# Patient Record
Sex: MALE | Race: WHITE | NOT HISPANIC OR LATINO | Employment: FULL TIME | URBAN - METROPOLITAN AREA
[De-identification: names, ages, dates, MRNs, and addresses within clinical notes are randomized per-mention and may not be internally consistent; named-entity substitution may affect disease eponyms.]

---

## 2019-10-25 ENCOUNTER — OFFICE VISIT - HEALTHEAST (OUTPATIENT)
Dept: INTERNAL MEDICINE | Facility: CLINIC | Age: 49
End: 2019-10-25

## 2019-10-25 ENCOUNTER — COMMUNICATION - HEALTHEAST (OUTPATIENT)
Dept: TELEHEALTH | Facility: CLINIC | Age: 49
End: 2019-10-25

## 2019-10-25 DIAGNOSIS — H69.93 DYSFUNCTION OF BOTH EUSTACHIAN TUBES: ICD-10-CM

## 2019-10-25 RX ORDER — ASPIRIN 81 MG/1
81 TABLET, CHEWABLE ORAL
Status: SHIPPED | COMMUNITY
Start: 2018-12-23

## 2019-10-25 ASSESSMENT — MIFFLIN-ST. JEOR: SCORE: 1819.27

## 2019-11-19 ENCOUNTER — COMMUNICATION - HEALTHEAST (OUTPATIENT)
Dept: INTERNAL MEDICINE | Facility: CLINIC | Age: 49
End: 2019-11-19

## 2019-11-19 ENCOUNTER — COMMUNICATION - HEALTHEAST (OUTPATIENT)
Dept: SCHEDULING | Facility: CLINIC | Age: 49
End: 2019-11-19

## 2019-11-19 DIAGNOSIS — E78.5 HYPERLIPIDEMIA LDL GOAL <100: ICD-10-CM

## 2019-11-26 ENCOUNTER — COMMUNICATION - HEALTHEAST (OUTPATIENT)
Dept: SCHEDULING | Facility: CLINIC | Age: 49
End: 2019-11-26

## 2019-11-26 DIAGNOSIS — E78.5 HYPERLIPIDEMIA LDL GOAL <100: ICD-10-CM

## 2019-12-17 ENCOUNTER — OFFICE VISIT - HEALTHEAST (OUTPATIENT)
Dept: INTERNAL MEDICINE | Facility: CLINIC | Age: 49
End: 2019-12-17

## 2019-12-17 DIAGNOSIS — I25.10 CORONARY ARTERY DISEASE INVOLVING NATIVE HEART WITHOUT ANGINA PECTORIS, UNSPECIFIED VESSEL OR LESION TYPE: ICD-10-CM

## 2019-12-17 DIAGNOSIS — Z86.73 HISTORY OF STROKE: ICD-10-CM

## 2019-12-17 DIAGNOSIS — I25.2 HISTORY OF HEART ATTACK: ICD-10-CM

## 2019-12-17 DIAGNOSIS — E78.5 HYPERLIPIDEMIA LDL GOAL <100: ICD-10-CM

## 2019-12-17 DIAGNOSIS — R73.03 PREDIABETES: ICD-10-CM

## 2019-12-17 DIAGNOSIS — Z13.29 SCREENING FOR THYROID DISORDER: ICD-10-CM

## 2019-12-17 DIAGNOSIS — Z12.5 SCREENING FOR PROSTATE CANCER: ICD-10-CM

## 2019-12-17 DIAGNOSIS — H69.93 DYSFUNCTION OF BOTH EUSTACHIAN TUBES: ICD-10-CM

## 2019-12-17 DIAGNOSIS — Z76.89 ENCOUNTER TO ESTABLISH CARE: ICD-10-CM

## 2019-12-17 DIAGNOSIS — Z87.891 FORMER SMOKER: ICD-10-CM

## 2019-12-17 ASSESSMENT — ANXIETY QUESTIONNAIRES
1. FEELING NERVOUS, ANXIOUS, OR ON EDGE: SEVERAL DAYS
7. FEELING AFRAID AS IF SOMETHING AWFUL MIGHT HAPPEN: NOT AT ALL
4. TROUBLE RELAXING: NOT AT ALL
2. NOT BEING ABLE TO STOP OR CONTROL WORRYING: SEVERAL DAYS
3. WORRYING TOO MUCH ABOUT DIFFERENT THINGS: SEVERAL DAYS
IF YOU CHECKED OFF ANY PROBLEMS ON THIS QUESTIONNAIRE, HOW DIFFICULT HAVE THESE PROBLEMS MADE IT FOR YOU TO DO YOUR WORK, TAKE CARE OF THINGS AT HOME, OR GET ALONG WITH OTHER PEOPLE: NOT DIFFICULT AT ALL
6. BECOMING EASILY ANNOYED OR IRRITABLE: NOT AT ALL
GAD7 TOTAL SCORE: 3
5. BEING SO RESTLESS THAT IT IS HARD TO SIT STILL: NOT AT ALL

## 2019-12-17 ASSESSMENT — PATIENT HEALTH QUESTIONNAIRE - PHQ9: SUM OF ALL RESPONSES TO PHQ QUESTIONS 1-9: 1

## 2019-12-17 ASSESSMENT — MIFFLIN-ST. JEOR: SCORE: 1832.88

## 2019-12-20 ENCOUNTER — AMBULATORY - HEALTHEAST (OUTPATIENT)
Dept: LAB | Facility: CLINIC | Age: 49
End: 2019-12-20

## 2019-12-20 DIAGNOSIS — I25.2 HISTORY OF HEART ATTACK: ICD-10-CM

## 2019-12-20 DIAGNOSIS — Z13.29 SCREENING FOR THYROID DISORDER: ICD-10-CM

## 2019-12-20 DIAGNOSIS — E78.5 HYPERLIPIDEMIA LDL GOAL <100: ICD-10-CM

## 2019-12-20 DIAGNOSIS — R73.03 PREDIABETES: ICD-10-CM

## 2019-12-20 DIAGNOSIS — Z12.5 SCREENING FOR PROSTATE CANCER: ICD-10-CM

## 2019-12-20 LAB
ALBUMIN SERPL-MCNC: 4.2 G/DL (ref 3.5–5)
ALBUMIN UR-MCNC: NEGATIVE MG/DL
ALP SERPL-CCNC: 120 U/L (ref 45–120)
ALT SERPL W P-5'-P-CCNC: 51 U/L (ref 0–45)
AMORPH CRY #/AREA URNS HPF: ABNORMAL /[HPF]
ANION GAP SERPL CALCULATED.3IONS-SCNC: 9 MMOL/L (ref 5–18)
APPEARANCE UR: ABNORMAL
AST SERPL W P-5'-P-CCNC: 23 U/L (ref 0–40)
BACTERIA #/AREA URNS HPF: ABNORMAL HPF
BASOPHILS # BLD AUTO: 0.1 THOU/UL (ref 0–0.2)
BASOPHILS NFR BLD AUTO: 1 % (ref 0–2)
BILIRUB SERPL-MCNC: 0.5 MG/DL (ref 0–1)
BILIRUB UR QL STRIP: NEGATIVE
BUN SERPL-MCNC: 15 MG/DL (ref 8–22)
CALCIUM SERPL-MCNC: 9.6 MG/DL (ref 8.5–10.5)
CHLORIDE BLD-SCNC: 105 MMOL/L (ref 98–107)
CHOLEST SERPL-MCNC: 163 MG/DL
CO2 SERPL-SCNC: 27 MMOL/L (ref 22–31)
COLOR UR AUTO: YELLOW
CREAT SERPL-MCNC: 0.86 MG/DL (ref 0.7–1.3)
EOSINOPHIL # BLD AUTO: 0.4 THOU/UL (ref 0–0.4)
EOSINOPHIL NFR BLD AUTO: 5 % (ref 0–6)
ERYTHROCYTE [DISTWIDTH] IN BLOOD BY AUTOMATED COUNT: 11 % (ref 11–14.5)
FASTING STATUS PATIENT QL REPORTED: YES
GFR SERPL CREATININE-BSD FRML MDRD: >60 ML/MIN/1.73M2
GLUCOSE BLD-MCNC: 123 MG/DL (ref 70–125)
GLUCOSE UR STRIP-MCNC: NEGATIVE MG/DL
HBA1C MFR BLD: 5.9 % (ref 3.5–6)
HCT VFR BLD AUTO: 46.3 % (ref 40–54)
HDLC SERPL-MCNC: 45 MG/DL
HGB BLD-MCNC: 15.3 G/DL (ref 14–18)
HGB UR QL STRIP: NEGATIVE
KETONES UR STRIP-MCNC: NEGATIVE MG/DL
LDLC SERPL CALC-MCNC: 93 MG/DL
LEUKOCYTE ESTERASE UR QL STRIP: NEGATIVE
LYMPHOCYTES # BLD AUTO: 1.4 THOU/UL (ref 0.8–4.4)
LYMPHOCYTES NFR BLD AUTO: 17 % (ref 20–40)
MCH RBC QN AUTO: 30.7 PG (ref 27–34)
MCHC RBC AUTO-ENTMCNC: 33.1 G/DL (ref 32–36)
MCV RBC AUTO: 93 FL (ref 80–100)
MONOCYTES # BLD AUTO: 0.7 THOU/UL (ref 0–0.9)
MONOCYTES NFR BLD AUTO: 8 % (ref 2–10)
NEUTROPHILS # BLD AUTO: 5.7 THOU/UL (ref 2–7.7)
NEUTROPHILS NFR BLD AUTO: 69 % (ref 50–70)
NITRATE UR QL: NEGATIVE
PH UR STRIP: 7 [PH] (ref 5–8)
PLATELET # BLD AUTO: 217 THOU/UL (ref 140–440)
PMV BLD AUTO: 7.4 FL (ref 7–10)
POTASSIUM BLD-SCNC: 4.2 MMOL/L (ref 3.5–5)
PROT SERPL-MCNC: 7 G/DL (ref 6–8)
PSA SERPL-MCNC: 0.3 NG/ML (ref 0–2.5)
RBC # BLD AUTO: 4.99 MILL/UL (ref 4.4–6.2)
RBC #/AREA URNS AUTO: ABNORMAL HPF
SODIUM SERPL-SCNC: 141 MMOL/L (ref 136–145)
SP GR UR STRIP: 1.02 (ref 1–1.03)
SQUAMOUS #/AREA URNS AUTO: ABNORMAL LPF
TRIGL SERPL-MCNC: 127 MG/DL
TSH SERPL DL<=0.005 MIU/L-ACNC: 1.79 UIU/ML (ref 0.3–5)
UROBILINOGEN UR STRIP-ACNC: ABNORMAL
WBC #/AREA URNS AUTO: ABNORMAL HPF
WBC: 8.3 THOU/UL (ref 4–11)

## 2020-02-19 ENCOUNTER — COMMUNICATION - HEALTHEAST (OUTPATIENT)
Dept: INTERNAL MEDICINE | Facility: CLINIC | Age: 50
End: 2020-02-19

## 2020-02-19 DIAGNOSIS — E78.5 HYPERLIPIDEMIA LDL GOAL <100: ICD-10-CM

## 2020-05-20 ENCOUNTER — COMMUNICATION - HEALTHEAST (OUTPATIENT)
Dept: PEDIATRICS | Facility: CLINIC | Age: 50
End: 2020-05-20

## 2020-05-20 DIAGNOSIS — E78.5 HYPERLIPIDEMIA LDL GOAL <100: ICD-10-CM

## 2020-05-26 ENCOUNTER — COMMUNICATION - HEALTHEAST (OUTPATIENT)
Dept: INTERNAL MEDICINE | Facility: CLINIC | Age: 50
End: 2020-05-26

## 2020-05-26 DIAGNOSIS — E78.5 HYPERLIPIDEMIA LDL GOAL <100: ICD-10-CM

## 2020-06-12 ENCOUNTER — COMMUNICATION - HEALTHEAST (OUTPATIENT)
Dept: INTERNAL MEDICINE | Facility: CLINIC | Age: 50
End: 2020-06-12

## 2020-07-22 ENCOUNTER — COMMUNICATION - HEALTHEAST (OUTPATIENT)
Dept: INTERNAL MEDICINE | Facility: CLINIC | Age: 50
End: 2020-07-22

## 2020-08-26 ENCOUNTER — COMMUNICATION - HEALTHEAST (OUTPATIENT)
Dept: INTERNAL MEDICINE | Facility: CLINIC | Age: 50
End: 2020-08-26

## 2020-08-26 DIAGNOSIS — E78.5 HYPERLIPIDEMIA LDL GOAL <100: ICD-10-CM

## 2020-09-02 ENCOUNTER — OFFICE VISIT - HEALTHEAST (OUTPATIENT)
Dept: INTERNAL MEDICINE | Facility: CLINIC | Age: 50
End: 2020-09-02

## 2020-09-02 DIAGNOSIS — R73.03 PREDIABETES: ICD-10-CM

## 2020-09-02 DIAGNOSIS — Z87.891 FORMER SMOKER: ICD-10-CM

## 2020-09-02 DIAGNOSIS — Z86.73 HISTORY OF STROKE: ICD-10-CM

## 2020-09-02 DIAGNOSIS — E66.3 OVERWEIGHT: ICD-10-CM

## 2020-09-02 DIAGNOSIS — I25.2 HISTORY OF HEART ATTACK: ICD-10-CM

## 2020-09-02 DIAGNOSIS — E78.5 HYPERLIPIDEMIA LDL GOAL <100: ICD-10-CM

## 2020-09-02 DIAGNOSIS — R03.0 ELEVATED BLOOD PRESSURE READING WITHOUT DIAGNOSIS OF HYPERTENSION: ICD-10-CM

## 2020-09-02 LAB
ALBUMIN SERPL-MCNC: 4.1 G/DL (ref 3.5–5)
ALP SERPL-CCNC: 111 U/L (ref 45–120)
ALT SERPL W P-5'-P-CCNC: 53 U/L (ref 0–45)
ANION GAP SERPL CALCULATED.3IONS-SCNC: 9 MMOL/L (ref 5–18)
AST SERPL W P-5'-P-CCNC: 30 U/L (ref 0–40)
BASOPHILS # BLD AUTO: 0.1 THOU/UL (ref 0–0.2)
BASOPHILS NFR BLD AUTO: 1 % (ref 0–2)
BILIRUB SERPL-MCNC: 0.5 MG/DL (ref 0–1)
BUN SERPL-MCNC: 19 MG/DL (ref 8–22)
CALCIUM SERPL-MCNC: 9.1 MG/DL (ref 8.5–10.5)
CHLORIDE BLD-SCNC: 104 MMOL/L (ref 98–107)
CHOLEST SERPL-MCNC: 178 MG/DL
CO2 SERPL-SCNC: 25 MMOL/L (ref 22–31)
CREAT SERPL-MCNC: 0.87 MG/DL (ref 0.7–1.3)
EOSINOPHIL # BLD AUTO: 0.5 THOU/UL (ref 0–0.4)
EOSINOPHIL NFR BLD AUTO: 7 % (ref 0–6)
ERYTHROCYTE [DISTWIDTH] IN BLOOD BY AUTOMATED COUNT: 11.3 % (ref 11–14.5)
FASTING STATUS PATIENT QL REPORTED: NO
GFR SERPL CREATININE-BSD FRML MDRD: >60 ML/MIN/1.73M2
GLUCOSE BLD-MCNC: 123 MG/DL (ref 70–125)
HBA1C MFR BLD: 5.8 %
HCT VFR BLD AUTO: 44.2 % (ref 40–54)
HDLC SERPL-MCNC: 40 MG/DL
HGB BLD-MCNC: 15.4 G/DL (ref 14–18)
LDLC SERPL CALC-MCNC: 81 MG/DL
LYMPHOCYTES # BLD AUTO: 1.3 THOU/UL (ref 0.8–4.4)
LYMPHOCYTES NFR BLD AUTO: 21 % (ref 20–40)
MCH RBC QN AUTO: 32 PG (ref 27–34)
MCHC RBC AUTO-ENTMCNC: 34.7 G/DL (ref 32–36)
MCV RBC AUTO: 92 FL (ref 80–100)
MONOCYTES # BLD AUTO: 0.4 THOU/UL (ref 0–0.9)
MONOCYTES NFR BLD AUTO: 7 % (ref 2–10)
NEUTROPHILS # BLD AUTO: 4.2 THOU/UL (ref 2–7.7)
NEUTROPHILS NFR BLD AUTO: 65 % (ref 50–70)
PLATELET # BLD AUTO: 231 THOU/UL (ref 140–440)
PMV BLD AUTO: 7.7 FL (ref 7–10)
POTASSIUM BLD-SCNC: 4.5 MMOL/L (ref 3.5–5)
PROT SERPL-MCNC: 7.1 G/DL (ref 6–8)
RBC # BLD AUTO: 4.8 MILL/UL (ref 4.4–6.2)
SODIUM SERPL-SCNC: 138 MMOL/L (ref 136–145)
TRIGL SERPL-MCNC: 287 MG/DL
WBC: 6.5 THOU/UL (ref 4–11)

## 2020-09-02 ASSESSMENT — MIFFLIN-ST. JEOR: SCORE: 1855.56

## 2020-09-14 ENCOUNTER — COMMUNICATION - HEALTHEAST (OUTPATIENT)
Dept: INTERNAL MEDICINE | Facility: CLINIC | Age: 50
End: 2020-09-14

## 2020-11-28 ENCOUNTER — COMMUNICATION - HEALTHEAST (OUTPATIENT)
Dept: INTERNAL MEDICINE | Facility: CLINIC | Age: 50
End: 2020-11-28

## 2021-01-04 ENCOUNTER — HEALTH MAINTENANCE LETTER (OUTPATIENT)
Age: 51
End: 2021-01-04

## 2021-01-08 ENCOUNTER — OFFICE VISIT - HEALTHEAST (OUTPATIENT)
Dept: INTERNAL MEDICINE | Facility: CLINIC | Age: 51
End: 2021-01-08

## 2021-01-08 DIAGNOSIS — Z87.891 FORMER SMOKER: ICD-10-CM

## 2021-01-08 DIAGNOSIS — I25.10 CORONARY ARTERY DISEASE INVOLVING NATIVE HEART WITHOUT ANGINA PECTORIS, UNSPECIFIED VESSEL OR LESION TYPE: ICD-10-CM

## 2021-01-08 DIAGNOSIS — L57.0 AK (ACTINIC KERATOSIS): ICD-10-CM

## 2021-01-08 DIAGNOSIS — F41.9 ANXIETY: ICD-10-CM

## 2021-01-08 DIAGNOSIS — Z86.73 HISTORY OF STROKE: ICD-10-CM

## 2021-01-08 DIAGNOSIS — E66.3 OVERWEIGHT: ICD-10-CM

## 2021-01-08 DIAGNOSIS — E78.5 HYPERLIPIDEMIA LDL GOAL <100: ICD-10-CM

## 2021-01-08 DIAGNOSIS — I25.2 HISTORY OF HEART ATTACK: ICD-10-CM

## 2021-01-08 ASSESSMENT — MIFFLIN-ST. JEOR: SCORE: 1887.31

## 2021-02-24 ENCOUNTER — AMBULATORY - HEALTHEAST (OUTPATIENT)
Dept: INTERNAL MEDICINE | Facility: CLINIC | Age: 51
End: 2021-02-24

## 2021-02-24 ENCOUNTER — COMMUNICATION - HEALTHEAST (OUTPATIENT)
Dept: ADMINISTRATIVE | Facility: CLINIC | Age: 51
End: 2021-02-24

## 2021-02-24 DIAGNOSIS — E78.5 HYPERLIPIDEMIA LDL GOAL <100: ICD-10-CM

## 2021-03-19 ENCOUNTER — COMMUNICATION - HEALTHEAST (OUTPATIENT)
Dept: ADMINISTRATIVE | Facility: CLINIC | Age: 51
End: 2021-03-19

## 2021-04-14 ENCOUNTER — OFFICE VISIT - HEALTHEAST (OUTPATIENT)
Dept: INTERNAL MEDICINE | Facility: CLINIC | Age: 51
End: 2021-04-14

## 2021-04-14 DIAGNOSIS — Z12.5 SCREENING FOR PROSTATE CANCER: ICD-10-CM

## 2021-04-14 DIAGNOSIS — R03.0 ELEVATED BLOOD PRESSURE READING WITHOUT DIAGNOSIS OF HYPERTENSION: ICD-10-CM

## 2021-04-14 DIAGNOSIS — E66.811 CLASS 1 OBESITY WITH SERIOUS COMORBIDITY AND BODY MASS INDEX (BMI) OF 31.0 TO 31.9 IN ADULT, UNSPECIFIED OBESITY TYPE: ICD-10-CM

## 2021-04-14 DIAGNOSIS — R73.03 PREDIABETES: ICD-10-CM

## 2021-04-14 DIAGNOSIS — I25.2 HISTORY OF HEART ATTACK: ICD-10-CM

## 2021-04-14 DIAGNOSIS — Z87.891 FORMER SMOKER: ICD-10-CM

## 2021-04-14 DIAGNOSIS — I25.10 CORONARY ARTERY DISEASE INVOLVING NATIVE HEART WITHOUT ANGINA PECTORIS, UNSPECIFIED VESSEL OR LESION TYPE: ICD-10-CM

## 2021-04-14 DIAGNOSIS — E78.5 HYPERLIPIDEMIA LDL GOAL <100: ICD-10-CM

## 2021-04-14 LAB
ALBUMIN SERPL-MCNC: 4.3 G/DL (ref 3.5–5)
ALP SERPL-CCNC: 116 U/L (ref 45–120)
ALT SERPL W P-5'-P-CCNC: 64 U/L (ref 0–45)
ANION GAP SERPL CALCULATED.3IONS-SCNC: 9 MMOL/L (ref 5–18)
AST SERPL W P-5'-P-CCNC: 23 U/L (ref 0–40)
BASOPHILS # BLD AUTO: 0 THOU/UL (ref 0–0.2)
BASOPHILS NFR BLD AUTO: 1 % (ref 0–2)
BILIRUB SERPL-MCNC: 0.6 MG/DL (ref 0–1)
BUN SERPL-MCNC: 18 MG/DL (ref 8–22)
CALCIUM SERPL-MCNC: 9 MG/DL (ref 8.5–10.5)
CHLORIDE BLD-SCNC: 104 MMOL/L (ref 98–107)
CO2 SERPL-SCNC: 27 MMOL/L (ref 22–31)
CREAT SERPL-MCNC: 0.88 MG/DL (ref 0.7–1.3)
EOSINOPHIL # BLD AUTO: 0.3 THOU/UL (ref 0–0.4)
EOSINOPHIL NFR BLD AUTO: 5 % (ref 0–6)
ERYTHROCYTE [DISTWIDTH] IN BLOOD BY AUTOMATED COUNT: 12.1 % (ref 11–14.5)
GFR SERPL CREATININE-BSD FRML MDRD: >60 ML/MIN/1.73M2
GLUCOSE BLD-MCNC: 147 MG/DL (ref 70–125)
HBA1C MFR BLD: 6.7 %
HCT VFR BLD AUTO: 45.4 % (ref 40–54)
HGB BLD-MCNC: 15.6 G/DL (ref 14–18)
IMM GRANULOCYTES # BLD: 0 THOU/UL
IMM GRANULOCYTES NFR BLD: 0 %
LYMPHOCYTES # BLD AUTO: 1.4 THOU/UL (ref 0.8–4.4)
LYMPHOCYTES NFR BLD AUTO: 22 % (ref 20–40)
MCH RBC QN AUTO: 30.4 PG (ref 27–34)
MCHC RBC AUTO-ENTMCNC: 34.4 G/DL (ref 32–36)
MCV RBC AUTO: 89 FL (ref 80–100)
MONOCYTES # BLD AUTO: 0.4 THOU/UL (ref 0–0.9)
MONOCYTES NFR BLD AUTO: 7 % (ref 2–10)
NEUTROPHILS # BLD AUTO: 4.1 THOU/UL (ref 2–7.7)
NEUTROPHILS NFR BLD AUTO: 65 % (ref 50–70)
PLATELET # BLD AUTO: 233 THOU/UL (ref 140–440)
PMV BLD AUTO: 9.6 FL (ref 7–10)
POTASSIUM BLD-SCNC: 4.6 MMOL/L (ref 3.5–5)
PROT SERPL-MCNC: 7 G/DL (ref 6–8)
PSA SERPL-MCNC: 0.4 NG/ML (ref 0–2.5)
RBC # BLD AUTO: 5.13 MILL/UL (ref 4.4–6.2)
SODIUM SERPL-SCNC: 140 MMOL/L (ref 136–145)
WBC: 6.2 THOU/UL (ref 4–11)

## 2021-04-14 RX ORDER — ATORVASTATIN CALCIUM 40 MG/1
40 TABLET, FILM COATED ORAL AT BEDTIME
Qty: 90 TABLET | Refills: 1 | Status: SHIPPED | OUTPATIENT
Start: 2021-04-14 | End: 2021-10-29

## 2021-04-14 RX ORDER — GLUCOSAMINE/CHONDROITIN/C/MANG 500-400 MG
1 CAPSULE ORAL DAILY
Status: SHIPPED | COMMUNITY
Start: 2021-04-14

## 2021-04-14 RX ORDER — CLOPIDOGREL BISULFATE 75 MG/1
75 TABLET ORAL DAILY
Qty: 90 TABLET | Refills: 1 | Status: SHIPPED | OUTPATIENT
Start: 2021-04-14 | End: 2021-10-29

## 2021-04-14 ASSESSMENT — ANXIETY QUESTIONNAIRES
4. TROUBLE RELAXING: NOT AT ALL
5. BEING SO RESTLESS THAT IT IS HARD TO SIT STILL: NOT AT ALL
IF YOU CHECKED OFF ANY PROBLEMS ON THIS QUESTIONNAIRE, HOW DIFFICULT HAVE THESE PROBLEMS MADE IT FOR YOU TO DO YOUR WORK, TAKE CARE OF THINGS AT HOME, OR GET ALONG WITH OTHER PEOPLE: NOT DIFFICULT AT ALL
2. NOT BEING ABLE TO STOP OR CONTROL WORRYING: SEVERAL DAYS
7. FEELING AFRAID AS IF SOMETHING AWFUL MIGHT HAPPEN: SEVERAL DAYS
GAD7 TOTAL SCORE: 2
1. FEELING NERVOUS, ANXIOUS, OR ON EDGE: NOT AT ALL
3. WORRYING TOO MUCH ABOUT DIFFERENT THINGS: NOT AT ALL
6. BECOMING EASILY ANNOYED OR IRRITABLE: NOT AT ALL

## 2021-04-14 ASSESSMENT — MIFFLIN-ST. JEOR: SCORE: 1879.6

## 2021-04-14 ASSESSMENT — PATIENT HEALTH QUESTIONNAIRE - PHQ9: SUM OF ALL RESPONSES TO PHQ QUESTIONS 1-9: 7

## 2021-05-26 ASSESSMENT — PATIENT HEALTH QUESTIONNAIRE - PHQ9: SUM OF ALL RESPONSES TO PHQ QUESTIONS 1-9: 1

## 2021-05-27 ASSESSMENT — PATIENT HEALTH QUESTIONNAIRE - PHQ9: SUM OF ALL RESPONSES TO PHQ QUESTIONS 1-9: 7

## 2021-05-28 ASSESSMENT — ANXIETY QUESTIONNAIRES
GAD7 TOTAL SCORE: 2
GAD7 TOTAL SCORE: 3

## 2021-06-01 ENCOUNTER — RECORDS - HEALTHEAST (OUTPATIENT)
Dept: ADMINISTRATIVE | Facility: CLINIC | Age: 51
End: 2021-06-01

## 2021-06-02 NOTE — PROGRESS NOTES
Internal Medicine Office Visit  [facility]  Patient Name: David Macias  Patient Age: 49 y.o.  YOB: 1970  MRN: [id number]    Date of Visit: 10/25/2019  Reason for Office Visit:   Chief Complaint   Patient presents with     Ear Fullness     Recently got back from [facility] around the . Ears still feel clogged, but mainly in the right ear. Patient wear ear buds caues he plays the drums. Has tried OTC ear drops.            Assessment / Plan / Medical Decision Makin. Dysfunction of both eustachian tubes  - predniSONE (DELTASONE) 10 mg tablet; Take 10 mg by mouth daily.  Dispense: 5 tablet; Refill: 0  - fluticasone propionate (FLONASE) 50 mcg/actuation nasal spray; 2 sprays into each nostril daily for 7 days.  Dispense: 16 g; Refill: 1    Patient advised if symptoms persist, worsen or new symptoms arise they are to seek medical care.  All patients questions addressed. Patient verbalized understanding and agreement with plan.     No orders of the defined types were placed in this encounter.  Followup: Return in about 2 weeks (around 2019). earlier if needed.    Health Maintenance Review  Health Maintenance   Topic Date Due     PREVENTIVE CARE VISIT  1970     HIV SCREENING  1987     TD 18+ HE  1990     TDAP ADULT ONE TIME DOSE  1990     ADVANCE CARE PLANNING  1990     INFLUENZA VACCINE RULE BASED (1) 2019         I am having David Macias start on predniSONE and fluticasone propionate. I am also having him maintain his atorvastatin, clopidogrel, and aspirin.      HPI:  David Macias is a 49 y.o. year old who presents to the office today Presents to clinic today with chief concern for fullness in his ears.  Patient returned from [place] on 2019 he had fullness in his ears right greater than left.  He has been utilizing some eardrops continued fullness noted no tinnitus or vertigo.  Patient reports he has  not utilize any additional over-the-counter home remedies for relief of symptoms.        Review of Systems- pertinent positive in bold:  Constitutional: Fever, chills, night sweats, fainting, weight change, fatigue, dizziness, sleeping difficulties, loud snoring/pauses in breathing  Eyes: change in vision, blurred or double vision, redness/eye pain  Ears, nose, mouth, throat: change in hearing, ear pain, hoarseness, difficulty swallowing, sores in the mouth or throat  Respiratory: shortness of breath, cough, bloody sputum, wheezing  Cardiovascular: chest pain, palpitations   Gastrointestinal: abdominal pain, heartburn/indigestion, nausea/vomiting, change in appetite, change in bowel habits, constipation or diarrhea, rectal bleeding/dark stools, difficulty swallowing  Urinary: painful urination, frequent urination, urinary urgency/incontinence, blood in urine/dark urine, nocturia (new or increasing), muscle cramps, swelling of hands, feet, ankles, leg pain/redness  Skin: change in moles/freckles, rash, nodules  Hematologic/lymphatic: swollen lymph glands, abnormal bruising/bleeding  Endocrine: excessive thirst/urination, cold or heat intolerance  Neurologic/emotional: worrisome memory change, numbness/tingling, anxiety, mood swings      Current Scheduled Meds:  Outpatient Encounter Medications as of 10/25/2019   Medication Sig Dispense Refill     aspirin 81 mg chewable tablet Chew 81 mg.       atorvastatin (LIPITOR) 40 MG tablet Take 40 mg by mouth.       clopidogrel (PLAVIX) 75 mg tablet Take 75 mg by mouth.       fluticasone propionate (FLONASE) 50 mcg/actuation nasal spray 2 sprays into each nostril daily for 7 days. 16 g 1     predniSONE (DELTASONE) 10 mg tablet Take 10 mg by mouth daily. 5 tablet 0     No facility-administered encounter medications on file as of 10/25/2019.      No past medical history on file.  No past surgical history on file.  Social History     Tobacco Use     Smoking status: Never Smoker  "    Smokeless tobacco: Never Used   Substance Use Topics     Alcohol use: Yes     Drug use: Never     No family history on file.  Objective / Physical Examination:  Vitals:    10/25/19 0915   BP: 144/80   Pulse: 65   Resp: 20   Temp: 98.8  F (37.1  C)   SpO2: 98%   Weight: 209 lb (94.8 kg)   Height: 5' 10\" (1.778 m)     Wt Readings from Last 3 Encounters:   10/25/19 209 lb (94.8 kg)     Body mass index is 29.99 kg/m .     General Appearance: Alert and oriented, cooperative, affect appropriate, speech clear, in no apparent distress  Head: Normocephalic, atraumatic  Ears: Bilateral retracted TMs are noted  Eyes: PERRL,  Conjunctivae clear and sclerae non-icteric  Nose: Septum midline, mild congestion noted clear nasal drainage  Throat: Lips and mucosa moist. Teeth in good repair, pharynx without erythema or exudate  Neck: Supple, trachea midline. No cervical adenopathy  Lungs: Clear to auscultation bilaterally. No adventitious lung sounds noted. Normal inspiratory and expiratory effort  Cardiovascular: Regular rate, normal S1, S2. No murmurs, rubs, or gallopst.  Integumentary: Warm and dry.   Neuro: Alert and oriented, follows commands appropriately.       Radha Reynolds, CNP  "

## 2021-06-03 VITALS
BODY MASS INDEX: 29.92 KG/M2 | WEIGHT: 209 LBS | OXYGEN SATURATION: 98 % | TEMPERATURE: 98.8 F | RESPIRATION RATE: 20 BRPM | HEIGHT: 70 IN | DIASTOLIC BLOOD PRESSURE: 80 MMHG | SYSTOLIC BLOOD PRESSURE: 144 MMHG | HEART RATE: 65 BPM

## 2021-06-03 VITALS
BODY MASS INDEX: 30.35 KG/M2 | HEIGHT: 70 IN | HEART RATE: 64 BPM | DIASTOLIC BLOOD PRESSURE: 72 MMHG | OXYGEN SATURATION: 98 % | WEIGHT: 212 LBS | SYSTOLIC BLOOD PRESSURE: 120 MMHG

## 2021-06-03 NOTE — TELEPHONE ENCOUNTER
Medication Question or Clarification  Who is calling: Patient  What medication are you calling about? (include dose and sig)    Disp Refills Start End    atorvastatin (LIPITOR) 40 MG tablet 30 tablet 1 11/20/2019 11/19/2020    Sig - Route: Take 1 tablet (40 mg total) by mouth at bedtime. - Oral    Sent to pharmacy as: atorvastatin 40 mg tablet (LIPITOR)    E-Prescribing Status: Receipt confirmed by pharmacy (11/20/2019  5:35 PM CST)           Disp Refills Start End    clopidogrel (PLAVIX) 75 mg tablet 30 tablet 1 11/20/2019 11/19/2020    Sig - Route: Take 1 tablet (75 mg total) by mouth daily. - Oral    Sent to pharmacy as: clopidogrel 75 mg tablet (PLAVIX)    E-Prescribing Status: Receipt confirmed by pharmacy (11/20/2019  5:35 PM CST)        Who prescribed the medication?: Radha Reynolds  What is your question/concern?: patient stated that he was not able to get his mediation due to pharmacy stating that Radha Reynolds had to order it as 90 day supply for both medication so patient's insurance can cover for it. Patient stated that pharmacist stated that insurance does not cover for 30 day supply.   Pharmacy: [facility][id number] ( patient also wanted the medications to be sent to [facility] and not [facility] ) - has been updated.   Okay to leave a detailed message?: Yes  [phone] - patient also stated that he is all out of medications.     Site CMT - Please call the pharmacy to obtain any additional needed information.

## 2021-06-03 NOTE — TELEPHONE ENCOUNTER
Patient calling - says he is in the middle of switching primary care physicians and is running out of his heart medications:  Plavix and Lipitor.   He says he saw Radha Reynolds NP one time for an ear problem and has an appointment to establish care with her on December 19, 2019.  He has 5 days worth of medications left and is requesting message to Radha Reynolds NP to find out if she can refill these 2 medications to get him through until his appointment.    [facility] [place] [facility] [address]    Please call patient if refills ar sent.    Deloris Fan, RN  Triage Nurse Advisor

## 2021-06-03 NOTE — TELEPHONE ENCOUNTER
Patient has upcoming appt with myself to establish care. No additional refills of medications will be provided if patient does not keep establish care appt

## 2021-06-03 NOTE — TELEPHONE ENCOUNTER
Last Office Visit  10/25/2019 Radha Reynolds CNP  Notes:    Ear Fullness       Recently got back from [facility] around the 16th of sept. Ears still feel clogged, but mainly in the right ear. Patient wear ear buds caues he plays the drums. Has tried OTC ear drops.          Last Filled:11/20/2019  Next OV:  12/19/2019    patient stated that he was not able to get his mediation due to pharmacy stating that Radha Reynolds had to order it as 90 day supply for both medication so patient's insurance can cover for it. Patient stated that pharmacist stated that insurance does not cover for 30 day supply.     Medication teed up for provider signature

## 2021-06-04 VITALS
SYSTOLIC BLOOD PRESSURE: 132 MMHG | HEIGHT: 70 IN | DIASTOLIC BLOOD PRESSURE: 74 MMHG | WEIGHT: 217 LBS | BODY MASS INDEX: 31.07 KG/M2 | HEART RATE: 68 BPM | RESPIRATION RATE: 24 BRPM | TEMPERATURE: 96.8 F | OXYGEN SATURATION: 97 %

## 2021-06-04 NOTE — PROGRESS NOTES
Internal Medicine Office Visit  [facility]  Patient Name: David Macias  Patient Age: 49 y.o.  YOB: 1970  MRN: [id number]    Date of Visit: 2019  Reason for Office Visit:   Chief Complaint   Patient presents with     Establish Care           Assessment / Plan / Medical Decision Makin. Encounter to establish care  Patient will receive updated Tdap today    2. Dysfunction of both eustachian tubes  Resolved follow-up as needed    3. Hyperlipidemia LDL goal <100  - Lipid Arthur FASTING; Future    4. History of stroke  No recurrence continue current medications    5. History of heart attack  No recurrence continue medications  - HM1(CBC and Differential); Future  - Comprehensive Metabolic Panel; Future  - Urinalysis-UC if Indicated; Future    6. Former smoker  Congratulated on discontinuation of smoking may discuss urine cytology and possible low-dose CT in the future    7. Screening for prostate cancer  - PSA, Annual Screen (Prostatic-Specific Antigen); Future    8. Prediabetes  - Glycosylated Hemoglobin A1c; Future    9. Screening for thyroid disorder  - Thyroid Stimulating Hormone (TSH); Future    10. Coronary artery disease involving native heart without angina pectoris, unspecified vessel or lesion type  Obtain a lipid panel patient will continue on all current medications as prescribed    All medications refilled for 6 months pending the lab work which patient will complete at a later date for fasting  Orders Placed This Encounter   Procedures     Comprehensive Metabolic Panel     Glycosylated Hemoglobin A1c     Thyroid Stimulating Hormone (TSH)     PSA, Annual Screen (Prostatic-Specific Antigen)     Lipid [place] FASTING     Urinalysis-UC if Indicated   Followup: Return in about 6 months (around 2020). earlier if needed.    Health Maintenance Review  Health Maintenance   Topic Date Due     PREVENTIVE CARE VISIT  1970     HIV SCREENING   "05/26/1987     TD 18+ HE  05/26/1990     TDAP ADULT ONE TIME DOSE  05/26/1990     ADVANCE CARE PLANNING  05/26/1990     LIPID  05/26/2007     INFLUENZA VACCINE RULE BASED (1) 08/01/2019         I have discontinued Inocencio Macias's predniSONE. I am also having him maintain his aspirin, atorvastatin, and clopidogrel.      HPI:  Dvaid Macias is a 49 y.o. year old who presents to the office today Presents to clinic today to establish care.  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  Patient states he was seen by sleep medicine and reports he \"convinced\" them he didn't need it.  He states the mask caused him to be unable to fall asleep and did attempt it for a couple of weeks. He states he has made some life adjustments and believe it has improved.  Patient reports he is feeling well, denying any additional concerns reports he is taking all medications as prescribed.    Occasional awakens at night to urinate.     He reports completion of annual eye exam and dental exam within the last year    Review of Systems- pertinent positive in bold:  Constitutional: Fever, chills, night sweats, fainting, weight change, fatigue, dizziness, sleeping difficulties, loud snoring/pauses in breathing  Eyes: change in vision, blurred or double vision, redness/eye pain  Ears, nose, mouth, throat: change in hearing, ear pain, hoarseness, difficulty swallowing, sores in the mouth or throat  Respiratory: shortness of breath, cough, bloody sputum, wheezing  Cardiovascular: chest pain, palpitations   Gastrointestinal: abdominal pain, heartburn/indigestion, nausea/vomiting, change in appetite, change in bowel habits, constipation or diarrhea, rectal bleeding/dark stools, difficulty swallowing  Urinary: " painful urination, frequent urination, urinary urgency/incontinence, blood in urine/dark urine, nocturia (new or increasing), muscle cramps, swelling of hands, feet, ankles, leg pain/redness  Skin: change in moles/freckles, rash, nodules  Hematologic/lymphatic: swollen lymph glands, abnormal bruising/bleeding  Endocrine: excessive thirst/urination, cold or heat intolerance  Neurologic/emotional: worrisome memory change, numbness/tingling, anxiety, mood swings      Current Scheduled Meds:  Outpatient Encounter Medications as of 12/17/2019   Medication Sig Dispense Refill     aspirin 81 mg chewable tablet Chew 81 mg.       atorvastatin (LIPITOR) 40 MG tablet Take 1 tablet (40 mg total) by mouth at bedtime. 90 tablet 0     clopidogrel (PLAVIX) 75 mg tablet Take 1 tablet (75 mg total) by mouth daily. 90 tablet 0     [DISCONTINUED] predniSONE (DELTASONE) 10 mg tablet Take 10 mg by mouth daily. 5 tablet 0     No facility-administered encounter medications on file as of 12/17/2019.      Past Medical History:   Diagnosis Date     History of MI (myocardial infarction)      Stroke (H)      Past Surgical History:   Procedure Laterality Date     CAROTID ENDARTERECTOMY Left      MOUTH SURGERY       Social History     Social History Narrative    Plays in a band.        Social History     Tobacco Use     Smoking status: Former Smoker     Packs/day: 0.00     Years: 20.00     Pack years: 0.00     Last attempt to quit: 2009     Years since quitting: 10.9     Smokeless tobacco: Never Used   Substance Use Topics     Alcohol use: Yes     Alcohol/week: 3.0 standard drinks     Types: 3 Cans of beer per week     Drug use: Not Currently     Types: Marijuana     Family History   Problem Relation Age of Onset     Heart disease Mother      Hyperlipidemia Mother      Hypertension Mother      Diabetes Mother      Heart attack Father      Heart disease Father      Diabetes Brother      No Medical Problems Brother      Objective / Physical  "Examination:  Vitals:    12/17/19 0958   BP: 120/72   Pulse: 64   SpO2: 98%   Weight: 212 lb (96.2 kg)   Height: 5' 10\" (1.778 m)     Wt Readings from Last 3 Encounters:   12/17/19 212 lb (96.2 kg)   10/25/19 209 lb (94.8 kg)     Body mass index is 30.42 kg/m .     General Appearance: Alert and oriented, cooperative, affect appropriate, speech clear, in no apparent distress  Head: Normocephalic, atraumatic  Ears: Tympanic membrane clear with landmarks well visualized bilaterally  Eyes: PERRL,  Conjunctivae clear and sclerae non-icteric  Nose: Septum midline, nares patent,  mucosa moist and without drainage  Throat: Lips and mucosa moist. Teeth in good repair, pharynx without erythema or exudate  Neck: Supple, trachea midline. No cervical adenopathy, no JVD or bruits appreciated  Lungs: Clear to auscultation bilaterally. No adventitious lung sounds noted. Normal inspiratory and expiratory effort  Cardiovascular: Regular rate, normal S1, S2. No murmurs, rubs, or gallops  Extremities: Pulses 2+ and equal throughout. No edema.   Integumentary: Warm and dry. Without suspicious looking lesions  Neuro: Alert and oriented, follows commands appropriately.       Radha Reynolds, CNP  "

## 2021-06-05 VITALS
SYSTOLIC BLOOD PRESSURE: 128 MMHG | WEIGHT: 224 LBS | BODY MASS INDEX: 32.07 KG/M2 | RESPIRATION RATE: 22 BRPM | TEMPERATURE: 98.6 F | HEART RATE: 66 BPM | OXYGEN SATURATION: 98 % | HEIGHT: 70 IN | DIASTOLIC BLOOD PRESSURE: 80 MMHG

## 2021-06-05 VITALS
WEIGHT: 222.3 LBS | SYSTOLIC BLOOD PRESSURE: 110 MMHG | HEART RATE: 59 BPM | OXYGEN SATURATION: 97 % | DIASTOLIC BLOOD PRESSURE: 70 MMHG | RESPIRATION RATE: 20 BRPM | HEIGHT: 70 IN | TEMPERATURE: 97.4 F | BODY MASS INDEX: 31.82 KG/M2

## 2021-06-06 NOTE — TELEPHONE ENCOUNTER
Refill Approved    Rx renewed per Medication Renewal Policy. Medication was last renewed on 11/26/19.    Deloris Fan, [facility] Triage/Med Refill 2/22/2020     Requested Prescriptions   Pending Prescriptions Disp Refills     atorvastatin (LIPITOR) 40 MG tablet 90 tablet 0     Sig: Take 1 tablet (40 mg total) by mouth at bedtime.       Statins Refill Protocol (Hmg CoA Reductase Inhibitors) Passed - 2/19/2020 11:29 AM        Passed - PCP or prescribing provider visit in past 12 months      Last office visit with prescriber/PCP: 12/17/2019 Radha Reynolds CNP OR same dept: 12/17/2019 Radha Reynolds CNP OR same specialty: 12/17/2019 Radha Reynolds CNP  Last physical: Visit date not found Last MTM visit: Visit date not found   Next visit within 3 mo: Visit date not found  Next physical within 3 mo: Visit date not found  Prescriber OR PCP: Radha Reynolds CNP  Last diagnosis associated with med order: 1. Hyperlipidemia LDL goal <100  - atorvastatin (LIPITOR) 40 MG tablet; Take 1 tablet (40 mg total) by mouth at bedtime.  Dispense: 90 tablet; Refill: 0    If protocol passes may refill for 12 months if within 3 months of last provider visit (or a total of 15 months).

## 2021-06-06 NOTE — TELEPHONE ENCOUNTER
Refill Approved    Rx renewed per Medication Renewal Policy. Medication was last renewed on 11/26/19.    Deloris Fan, [facility] Triage/Med Refill 2/22/2020     Requested Prescriptions   Pending Prescriptions Disp Refills     clopidogreL (PLAVIX) 75 mg tablet 90 tablet 0     Sig: Take 1 tablet (75 mg total) by mouth daily.       Clopidogrel/Prasugrel/Ticagrelor Refill Protocol Passed - 2/19/2020  2:22 PM        Passed - PCP or prescribing provider visit in past 6 months       Last office visit with prescriber/PCP: 12/17/2019 OR same dept: 12/17/2019 Radha Reynolds CNP OR same specialty: 12/17/2019 Radha Reynolds CNP Last physical: Visit date not found Last MTM visit: Visit date not found     Next appt within 3 mo: Visit date not found  Next physical within 3 mo: Visit date not found  Prescriber OR PCP: Radha Reynolds CNP  Last diagnosis associated with med order: 1. Hyperlipidemia LDL goal <100  - clopidogreL (PLAVIX) 75 mg tablet; Take 1 tablet (75 mg total) by mouth daily.  Dispense: 90 tablet; Refill: 0    If protocol passes may refill for 6 months if within 3 months of last provider visit (or a total of 9 months).              Passed - Hemoglobin in past 12 months     Hemoglobin   Date Value Ref Range Status   12/20/2019 15.3 14.0 - 18.0 g/dL Final

## 2021-06-06 NOTE — TELEPHONE ENCOUNTER
Refill Request  Did you contact pharmacy: Fax per pharmacy  Medication name:   Requested Prescriptions     Pending Prescriptions Disp Refills     atorvastatin (LIPITOR) 40 MG tablet 90 tablet 0     Sig: Take 1 tablet (40 mg total) by mouth at bedtime.     Who prescribed the medication: Radha Reynolds CNP    [facility]  Is patient out of medication: unsure  Patient notified refills processed in 3 business days:  no  Okay to leave a detailed message: yes

## 2021-06-06 NOTE — TELEPHONE ENCOUNTER
Refill Request  Did you contact pharmacy: Fax per pharmacy  Medication name:   Requested Prescriptions     Pending Prescriptions Disp Refills     clopidogreL (PLAVIX) 75 mg tablet 90 tablet 0     Sig: Take 1 tablet (75 mg total) by mouth daily.     Who prescribed the medication: Radha Reynolds CNP  [facility]  Is patient out of medication: Unsure  Patient notified refills processed in 3 business days:  no  Okay to leave a detailed message: yes

## 2021-06-08 NOTE — TELEPHONE ENCOUNTER
Called the patient to reschedule the appt made on 6/17/2020. Clinic ours have changed and patient scheduled for 7:00 am. Multiple calls to patient have been made to rescheduled. I left a VM stating that I will be cancelling the appointment and for patient to call back to schedule at his convince.

## 2021-06-08 NOTE — TELEPHONE ENCOUNTER
Patient Returning Call  Reason for call:  Patient is calling in very frustrated stating I have not been able to take my medication for several days now.  Information relayed to patient:  Patient stated I have been trying to get this medication for several weeks now and this never happens. Patient stated At first it was sent to the wrong pharmacy ([facility]) then [facility]. Patient was advised to call another [facility] that is open and [facility]. Patient will return call if there is an issue with filling this Rx.   Patient has additional questions:  No  If YES, what are your questions/concerns:  noone  Okay to leave a detailed message?: Yes

## 2021-06-08 NOTE — TELEPHONE ENCOUNTER
I called the patient and left a VM with the message from charley salmon. I stated to send us a [employer] message if he is needing any further assistance for his medication refills.

## 2021-06-08 NOTE — TELEPHONE ENCOUNTER
Please contact patient to ensure he received his prescription and determine how script was sent to wrong pharmacy. Please verify.  Please also apologize to patient and advise to request refill a minimum of 7 days prior to needing refill and to message my office via [employer] if he is having difficultly so that I ensure it is taken care of .      Thanks

## 2021-06-09 NOTE — TELEPHONE ENCOUNTER
Recommend patient trial wearing a mask and home and increase in time of wearing mask as tolerated at home so that he will wear a mask at work if required or in close proximity to others while in the community.

## 2021-06-09 NOTE — TELEPHONE ENCOUNTER
Who is calling:  Patient   Reason for Call:  I just heard my employer is going to require a mask all day.  I am concern as I have a heart condition and I can only tolerate a mask about 20 minutes. Is this a valid reason to request not to wear one? Please advise.   Date of last appointment with primary care: 12/17/19  Okay to leave a detailed message: Yes

## 2021-06-10 NOTE — TELEPHONE ENCOUNTER
RN cannot approve Refill Request    RN can NOT refill this medication Protocol failed and NO refill given. Last office visit: 12/17/2019 Radha Reynolds CNP Last Physical: Visit date not found Last MTM visit: Visit date not found Last visit same specialty: 12/17/2019 Radha Reynolds CNP.  Next visit within 3 mo: Visit date not found  Next physical within 3 mo: Visit date not found      Ailyn Samuels, [facility] Triage/Med Refill 8/26/2020    Requested Prescriptions   Pending Prescriptions Disp Refills     clopidogreL (PLAVIX) 75 mg tablet 90 tablet 0     Sig: Take 1 tablet (75 mg total) by mouth daily.       Clopidogrel/Prasugrel/Ticagrelor Refill Protocol Failed - 8/26/2020 12:49 PM        Failed - PCP or prescribing provider visit in past 6 months       Last office visit with prescriber/PCP: Visit date not found OR same dept: 12/17/2019 Radha Reynolds CNP OR same specialty: 12/17/2019 Radha Reynolds CNP Last physical: Visit date not found Last MTM visit: Visit date not found     Next appt within 3 mo: Visit date not found  Next physical within 3 mo: Visit date not found  Prescriber OR PCP: Radha Reynolds CNP  Last diagnosis associated with med order: 1. Hyperlipidemia LDL goal <100  - clopidogreL (PLAVIX) 75 mg tablet; Take 1 tablet (75 mg total) by mouth daily.  Dispense: 90 tablet; Refill: 0    If protocol passes may refill for 6 months if within 3 months of last provider visit (or a total of 9 months).              Passed - Hemoglobin in past 12 months     Hemoglobin   Date Value Ref Range Status   12/20/2019 15.3 14.0 - 18.0 g/dL Final

## 2021-06-11 NOTE — PROGRESS NOTES
Internal Medicine Office Visit  [facility]  Patient Name: David Macias  Patient Age: 50 y.o.  YOB: 1970  MRN: [id number]    Date of Visit: 2020  Reason for Office Visit:   Chief Complaint   Patient presents with     Follow-up     needing labs and medication refills.            Assessment / Plan / Medical Decision Makin. Hyperlipidemia LDL goal <100  - clopidogreL (PLAVIX) 75 mg tablet; Take 1 tablet (75 mg total) by mouth daily.  Dispense: 90 tablet; Refill: 1  - HM1(CBC and Differential)  - HM1 (CBC with Diff)  - Lipid [place] FASTING    2. Prediabetes  - Glycosylated Hemoglobin A1c  - Comprehensive Metabolic Panel    3. Former smoker  Continues to not smoke    4. History of stroke  5. History of heart attack  Continues on Plavix and atorvastatin as prescribed    6. Elevated blood pressure reading without diagnosis of hypertension  Recommend patient check his blood pressure one time per day if greater than 120/80 over the next week he will contact my office will start him on lisinopril 10 mg daily    7. Overweight  Recommend 30 minutes of purposeful activity most days a week outside of his regular schedule weight loss is encouraged    Orders Placed This Encounter   Procedures     Glycosylated Hemoglobin A1c     Comprehensive Metabolic Panel     HM1 (CBC with Diff)     Lipid [place] FASTING   Followup: Return in about 6 months (around 3/2/2021). earlier if needed.    Health Maintenance Review  Health Maintenance   Topic Date Due     PREVENTIVE CARE VISIT  1972     HIV SCREENING  1987     ADVANCE CARE PLANNING  1990     INFLUENZA VACCINE RULE BASED (1) 2020     LIPID  2024     TD 18+ HE  2029     TDAP ADULT ONE TIME DOSE  Completed     HEPATITIS B VACCINES  Aged Out         I am having Inocencio Macias maintain his aspirin, atorvastatin, and clopidogreL.      HPI:  David Macias is a 50 y.o. 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  Patient reports he is feeling well he continues to take his medications as prescribed he notes no concerns today.          Review of Systems- pertinent positive in bold:  Constitutional: Fever, chills, night sweats, fainting, weight change, fatigue, dizziness, sleeping difficulties, loud snoring/pauses in breathing  Eyes: change in vision, blurred or double vision, redness/eye pain  Ears, nose, mouth, throat: change in hearing, ear pain, hoarseness, difficulty swallowing, sores in the mouth or throat  Respiratory: shortness of breath, cough, bloody sputum, wheezing  Cardiovascular: chest pain, palpitations   Gastrointestinal: abdominal pain, heartburn/indigestion, nausea/vomiting, change in appetite, change in bowel habits, constipation or diarrhea, rectal bleeding/dark stools, difficulty swallowing  Urinary: painful urination, frequent urination, urinary urgency/incontinence, blood in urine/dark urine, nocturia (new or increasing), muscle cramps, swelling of hands, feet, ankles, leg pain/redness  Skin: change in moles/freckles, rash, nodules  Hematologic/lymphatic: swollen lymph glands, abnormal bruising/bleeding  Endocrine: excessive thirst/urination, cold or heat intolerance  Neurologic/emotional: worrisome memory change, numbness/tingling, anxiety, mood swings      Current Scheduled Meds:  Outpatient Encounter Medications as of 9/2/2020   Medication Sig Dispense Refill     aspirin 81 mg chewable tablet Chew 81 mg.       atorvastatin (LIPITOR) 40 MG tablet Take 1 tablet (40 mg total) by mouth at bedtime. 90 tablet 3     clopidogreL (PLAVIX) 75 mg tablet Take 1 tablet (75 mg total) by mouth daily. 90  "tablet 1     [DISCONTINUED] clopidogreL (PLAVIX) 75 mg tablet Take 1 tablet (75 mg total) by mouth daily. 90 tablet 0     No facility-administered encounter medications on file as of 2020.      Past Medical History:   Diagnosis Date     History of MI (myocardial infarction)      Stroke (H)      Past Surgical History:   Procedure Laterality Date     CAROTID ENDARTERECTOMY Left      MOUTH SURGERY       Social History     Tobacco Use     Smoking status: Former Smoker     Packs/day: 0.00     Years: 20.00     Pack years: 0.00     Last attempt to quit:      Years since quittin.6     Smokeless tobacco: Never Used   Substance Use Topics     Alcohol use: Yes     Alcohol/week: 3.0 standard drinks     Types: 3 Cans of beer per week     Drug use: Not Currently     Types: Marijuana     Family History   Problem Relation Age of Onset     Heart disease Mother      Hyperlipidemia Mother      Hypertension Mother      Diabetes Mother      Heart attack Father      Heart disease Father      Diabetes Brother      No Medical Problems Brother      Social History     Social History Narrative    Plays in a band.        Objective / Physical Examination:  Vitals:    20 0717   BP: 132/74   Pulse: 68   Resp: 24   Temp: 96.8  F (36  C)   SpO2: 97%   Weight: 217 lb (98.4 kg)   Height: 5' 10\" (1.778 m)     Wt Readings from Last 3 Encounters:   20 217 lb (98.4 kg)   19 212 lb (96.2 kg)   10/25/19 209 lb (94.8 kg)     Body mass index is 31.14 kg/m .     General Appearance: Alert and oriented, cooperative, affect appropriate, speech clear, in no apparent distress  Head: Normocephalic, atraumatic  Ears: Tympanic membrane clear with landmarks well visualized bilaterally  Eyes: PERRL,  Conjunctivae clear and sclerae non-icteric  Nose: Septum midline, nares patent,  mucosa moist and without drainage  Throat: Lips and mucosa moist. Teeth in good repair, pharynx without erythema or exudate  Neck: Supple, trachea midline. No " cervical adenopathy, no JVD or bruits appreciated  Lungs: Clear to auscultation bilaterally. No adventitious lung sounds noted. Normal inspiratory and expiratory effort  Cardiovascular: Regular rate, normal S1, S2. No murmurs, rubs, or gallops  Extremities: Pulses 2+ and equal throughout. No edema.   Integumentary: Warm and dry.   Neuro: Alert and oriented, follows commands appropriately.     No results found.  No results found for this or any previous visit (from the past 240 hour(s)).        Radha Reynolds, CNP

## 2021-06-15 NOTE — TELEPHONE ENCOUNTER
Reason for Call:  Medication or medication refill:    Do you use a [facility]?  Name of the pharmacy and phone number for the current request: [facility] on file    Name of the medication requested:     Atorvastatin 40 mg  Clopidogrel 75 mg    Other request: Patient reports he has about 1 week of meds left before he needs refills.  Patient was scheduled for office visit on 3/3/21 with Radha, but has had to push this out to 4/15/21 due to moving to a new home.  Please call patient with any questions or concerns.    Can we leave a detailed message on this number? Yes    Phone number patient can be reached at: Home number on file [phone] (home)    Best Time: Any time    Call taken on 2/24/2021 at 11:37 AM by Azam Galvan

## 2021-06-30 NOTE — PROGRESS NOTES
Progress Notes by Radha Reynolds CNP at 2021 10:20 AM     Author: Radha Reynolds CNP Service: -- Author Type: Nurse Practitioner    Filed: 2021 10:07 AM Encounter Date: 2021 Status: Signed    : Radha Reynolds CNP (Nurse Practitioner)            [facility]  Patient Name: David Macias  Patient Age: 50 y.o.  YOB: 1970  MRN: [id number]    Date of Visit: 2021  Reason for Office Visit:   Chief Complaint   Patient presents with   ? Skin Issue     Patient reports a spot on the left side of face on cheek. Little red/flake scab. No change in size. Does get more red when in the sun. No pain.            Assessment / Plan / Medical Decision Makin. AK (actinic keratosis)  Cryotherapy completed patient tolerated procedure well follow-up.    2. BMI 32.0-32.9,adult  Encouraged 30 minutes purposeful activity most days of the week weight loss is encouraged    3. History of stroke  4. Hyperlipidemia LDL goal <100  5. Coronary artery disease involving native heart without angina pectoris, unspecified vessel or lesion type  .  Stable recommend no medication changes at this time    6. Anxiety  Patient will continue utilize Atarax on a as needed basis if he would like to start on a long-term medication he may contact my office    7. History of heart attack  8. Overweight  9. Former smoker  Patient is congratulated on her discontinuation of smoking and alcohol consumption and increasing his physical activity as well as lifestyle dietary changes.  Recommend no medication changes at this time    No orders of the defined types were placed in this encounter.  Followup: No follow-ups on file. earlier if needed.            Health Maintenance Review  Health Maintenance   Topic Date Due   ? HEPATITIS C SCREENING  1972   ? PREVENTIVE CARE VISIT  1972   ? HIV SCREENING  1987   ? ADVANCE CARE PLANNING  1990   ? INFLUENZA VACCINE RULE BASED  (1) 08/01/2020   ? LIPID  09/02/2025   ? TD 18+ HE  12/17/2029   ? TDAP ADULT ONE TIME DOSE  Completed   ? Pneumococcal Vaccine: Pediatrics (0 to 5 Years) and At-Risk Patients (6 to 64 Years)  Aged Out   ? HEPATITIS B VACCINES  Aged Out         I am having Inocencio Macias maintain his aspirin, atorvastatin, clopidogreL, and hydrOXYzine pamoate.      HPI:  David Macias is a 50 y.o. year old who presents to the office today with chronic conditions including Dysfunction of both eustachian tubes, hyperlipidemia, history of stroke, history of MI, former smoker, prediabetes, coronary artery disease, history of cholesterol retinal embolus of the left eye, history of retinal edema, DIMAS, history of stenosis of left carotid artery, elevated blood pressure reading without diagnosis of hypertension, overweight, hyperlipidemia with an LDL goal less than 100.    Patient presents clinic today with a skin lesion on his left cheek he states has been there for couple of years it does slough off and itch but has not enlarged in size.      Review of Systems- pertinent positive in bold:  Constitutional: Fever, chills, night sweats, fainting, weight change, fatigue, dizziness, sleeping difficulties, loud snoring/pauses in breathing  Eyes: change in vision, blurred or double vision, redness/eye pain  Ears, nose, mouth, throat: change in hearing, ear pain, hoarseness, difficulty swallowing, sores in the mouth or throat  Respiratory: shortness of breath, cough, bloody sputum, wheezing  Cardiovascular: chest pain, palpitations   Gastrointestinal: abdominal pain, heartburn/indigestion, nausea/vomiting, change in appetite, change in bowel habits, constipation or diarrhea, rectal bleeding/dark stools, difficulty swallowing  Urinary: painful urination, frequent urination, urinary urgency/incontinence, blood in urine/dark urine, nocturia (new or increasing), muscle cramps, swelling of hands, feet, ankles, leg pain/redness  Skin: See  "HPI  Hematologic/lymphatic: swollen lymph glands, abnormal bruising/bleeding  Endocrine: excessive thirst/urination, cold or heat intolerance  Neurologic/emotional: worrisome memory change, numbness/tingling, anxiety, mood swings      Current Scheduled Meds:  Outpatient Encounter Medications as of 2021   Medication Sig Dispense Refill   ? aspirin 81 mg chewable tablet Chew 81 mg.     ? atorvastatin (LIPITOR) 40 MG tablet Take 1 tablet (40 mg total) by mouth at bedtime. 90 tablet 3   ? clopidogreL (PLAVIX) 75 mg tablet Take 1 tablet (75 mg total) by mouth daily. 90 tablet 1   ? hydrOXYzine pamoate (VISTARIL) 25 MG capsule Take 1 capsule (25 mg total) by mouth 3 (three) times a day as needed for anxiety. 21 capsule 0     No facility-administered encounter medications on file as of 2021.      Past Medical History:   Diagnosis Date   ? History of MI (myocardial infarction)    ? Stroke (H)      Past Surgical History:   Procedure Laterality Date   ? CAROTID ENDARTERECTOMY Left    ? MOUTH SURGERY       Social History     Tobacco Use   ? Smoking status: Former Smoker     Packs/day: 0.00     Years: 20.00     Pack years: 0.00     Quit date:      Years since quittin.0   ? Smokeless tobacco: Never Used   Substance Use Topics   ? Alcohol use: Yes     Alcohol/week: 3.0 standard drinks     Types: 3 Cans of beer per week   ? Drug use: Not Currently     Types: Marijuana     Family History   Problem Relation Age of Onset   ? Heart disease Mother    ? Hyperlipidemia Mother    ? Hypertension Mother    ? Diabetes Mother    ? Heart attack Father    ? Heart disease Father    ? Diabetes Brother    ? No Medical Problems Brother      Social History     Social History Narrative    Plays in a band.        Objective / Physical Examination:  Vitals:    21 1023   BP: 128/80   Pulse: 66   Resp: 22   Temp: 98.6  F (37  C)   SpO2: 98%   Weight: (!) 224 lb (101.6 kg)   Height: 5' 10\" (1.778 m)     Wt Readings from Last 3 " Encounters:   01/08/21 (!) 224 lb (101.6 kg)   12/20/20 220 lb (99.8 kg)   09/02/20 217 lb (98.4 kg)     Body mass index is 32.14 kg/m .     General Appearance: Alert and oriented, cooperative, affect appropriate, speech clear, in no apparent distress  Head: Normocephalic, atraumatic  Eyes: Conjunctivae clear and sclerae non-icteric  Lungs:  Normal inspiratory and expiratory effort  Integumentary: Warm and dry.  Patient noted to have actinic keratotic lesion on his cheek cryotherapy 3 cycles 10 seconds each completed patient taught procedure well  Neuro: Alert and oriented, follows commands appropriately.     No results found.  No results found for this or any previous visit (from the past 240 hour(s)).  Diagnostics:     Results for orders placed or performed during the hospital encounter of 12/20/20   Comprehensive Metabolic Panel   Result Value Ref Range    Sodium 139 136 - 145 mmol/L    Potassium 4.0 3.5 - 5.0 mmol/L    Chloride 102 98 - 107 mmol/L    CO2 23 22 - 31 mmol/L    Anion Gap, Calculation 14 5 - 18 mmol/L    Glucose 168 (H) 70 - 125 mg/dL    BUN 13 8 - 22 mg/dL    Creatinine 0.88 0.70 - 1.30 mg/dL    GFR MDRD Af Amer >60 >60 mL/min/1.73m2    GFR MDRD Non Af Amer >60 >60 mL/min/1.73m2    Bilirubin, Total 0.5 0.0 - 1.0 mg/dL    Calcium 8.9 8.5 - 10.5 mg/dL    Protein, Total 7.5 6.0 - 8.0 g/dL    Albumin 4.1 3.5 - 5.0 g/dL    Alkaline Phosphatase 120 45 - 120 U/L    AST 36 0 - 40 U/L    ALT 92 (H) 0 - 45 U/L   Lipase   Result Value Ref Range    Lipase 20 0 - 52 U/L   HM2 (CBC W/O DIFF)   Result Value Ref Range    WBC 7.9 4.0 - 11.0 thou/uL    RBC 5.30 4.40 - 6.20 mill/uL    Hemoglobin 16.1 14.0 - 18.0 g/dL    Hematocrit 46.5 40.0 - 54.0 %    MCV 88 80 - 100 fL    MCH 30.4 27.0 - 34.0 pg    MCHC 34.6 32.0 - 36.0 g/dL    RDW 11.9 11.0 - 14.5 %    Platelets 251 140 - 440 thou/uL    MPV 9.6 8.5 - 12.5 fL   Magnesium   Result Value Ref Range    Magnesium 2.1 1.8 - 2.6 mg/dL   Troponin I   Result Value Ref Range     Troponin I <0.01 0.00 - 0.29 ng/mL   Thyroid Stimulating Hormone (TSH)   Result Value Ref Range    TSH 1.88 0.30 - 5.00 uIU/mL   ECG 12 lead nursing unit performed   Result Value Ref Range    SYSTOLIC BLOOD PRESSURE      DIASTOLIC BLOOD PRESSURE      VENTRICULAR RATE 84 BPM    ATRIAL RATE 84 BPM    P-R INTERVAL 168 ms    QRS DURATION 90 ms    Q-T INTERVAL 358 ms    QTC CALCULATION (BEZET) 423 ms    P Axis 62 degrees    R AXIS 45 degrees    T AXIS 54 degrees    MUSE DIAGNOSIS       Sinus rhythm with occasional Premature ventricular complexes  Otherwise normal ECG  When compared with ECG of 04-NOV-2009 22:51,  Premature ventricular complexes are now Present  Criteria for Inferior infarct are no longer Present  T wave inversion no longer evident in Inferior leads  Confirmed by SEE ED PROVIDER NOTE FOR, ECG INTERPRETATION ([id number]),  Freddy Hayes ([id number]) on 12/23/2020 1:47:53 PM         Quality review:     No data recorded    No data recorded      Radha Reynolds, [facility]  Internal Medicine  [address] [place] [id number]

## 2021-06-30 NOTE — PROGRESS NOTES
Progress Notes by Radha Reynolds CNP at 2021  7:50 AM     Author: Radha Reynolds CNP Service: -- Author Type: Nurse Practitioner    Filed: 2021  9:00 AM Encounter Date: 2021 Status: Signed    : Radha Reynolds CNP (Nurse Practitioner)            [facility]  Patient Name: David Macias  Patient Age: 50 y.o.  YOB: 1970  MRN: [id number]    Date of Visit: 2021  Reason for Office Visit:   Chief Complaint   Patient presents with   ? Follow-up     medication check   ? Depression     Quality PHQ9 score 7, GAD7 score 2, patient says no need to discuss today            Assessment / Plan / Medical Decision Makin. Prediabetes  Lab Results   Component Value Date    HGBA1C 5.8 (H) 2020   Patient is not currently on any diabetic medications.  We will recheck a hemoglobin A1c today.  Did discuss with patient the recommendation to follow diabetic by diet which  - Glycosylated Hemoglobin A1c    2. Screening for prostate cancer  - PSA, Annual Screen (Prostatic-Specific Antigen)    3. Coronary artery disease involving native heart without angina pectoris, unspecified vessel or lesion type  4. Hyperlipidemia LDL goal <100  5. History of heart attack  - atorvastatin (LIPITOR) 40 MG tablet; Take 1 tablet (40 mg total) by mouth at bedtime.  Dispense: 90 tablet; Refill: 1  - clopidogreL (PLAVIX) 75 mg tablet; Take 1 tablet (75 mg total) by mouth daily.  Dispense: 90 tablet; Refill: 1  The ASCVD Risk score (Matthew DC Jr., et al., 2013) failed to calculate for the following reasons:    The patient has a prior MI or stroke diagnosis   recommend patient continue to follow a low-cholesterol diet, increasing his physical activity to 30 minutes most days of the week and weight loss is encouraged.  Recommend continuation of atorvastatin and Plavix as prescribed    6. Elevated blood pressure reading without diagnosis of hypertension  Patient's blood  pressure remains at target range at 110/70 is currently not on any blood pressure medications he is congratulated on making some lifestyle changes which have positively affected his overall health    7. Former smoker  Patient is a former smoker quitting in 2009 and has not reoccurred    8. Class 1 obesity with serious comorbidity and body mass index (BMI) of 31.0 to 31.9 in adult, unspecified obesity type  Reviewed with patient dietary considerations of the recommendation to reduce his carbohydrates increase protein and vegetables as well as increasing physical activity to 30 minutes most days of the week weight loss is encouraged.  he has been walking on his treadmill daily and has plans to get an elliptical. He has been having cereal and milk for breakfast and protein pack for lunch and meat, potatoes and vegetables for supper.       Orders Placed This Encounter   Procedures   ? Glycosylated Hemoglobin A1c   ? PSA, Annual Screen (Prostatic-Specific Antigen)   ? Comprehensive Metabolic Panel   ? HM1 (CBC with Diff)   Followup: Return in about 6 months (around 10/14/2021). earlier if needed.      I spent a total of 34 minutes on the day of the visit.  . Time spent doing chart review, history and exam, documentation and further activities per the note      Health Maintenance Review  Health Maintenance   Topic Date Due   ? HEPATITIS C SCREENING  Never done   ? PREVENTIVE CARE VISIT  Never done   ? Pneumococcal Vaccine: Pediatrics (0 to 5 Years) and At-Risk Patients (6 to 64 Years) (1 of 4 - PCV13) Never done   ? HIV SCREENING  Never done   ? COVID-19 Vaccine (1) Never done   ? ADVANCE CARE PLANNING  Never done   ? INFLUENZA VACCINE RULE BASED (1) Never done   ? LIPID  09/02/2025   ? TD 18+ HE  12/17/2029   ? TDAP ADULT ONE TIME DOSE  Completed   ? HEPATITIS B VACCINES  Aged Out         I am having Inocencio Macias maintain his aspirin, hydrOXYzine pamoate, glucosamine-chondroit-vit C-Mn, atorvastatin, and clopidogreL.       HPI:  David Macias is a 50 y.o. year old who presents to the office today with chronic conditions including Dysfunction of both eustachian tubes, hyperlipidemia, history of stroke, history of MI, former smoker, prediabetes, coronary artery disease, history of cholesterol retinal embolus of the left eye, history of retinal edema, DIMAS, history of stenosis of left carotid artery, elevated blood pressure reading without diagnosis of hypertension, overweight, hyperlipidemia with an LDL goal less than 100.      Patient reports that he is feeling well but he recently moved from the apartment to a townhouse this was initially quite stressful for him and has utilizes hydroxyzine a couple of times.  He reports that he is taking his medications as prescribed and denies any difficulty with these medications.  Patient reports that he has a new treadmill and is in the process of obtaining an elliptical machine with a goal to complete 30 minutes of purposeful activity most days of the week.    Patient denies any additional concerns.      Review of Systems-see HPI    Current Scheduled Meds:  Outpatient Encounter Medications as of 4/14/2021   Medication Sig Dispense Refill   ? aspirin 81 mg chewable tablet Chew 81 mg.     ? atorvastatin (LIPITOR) 40 MG tablet Take 1 tablet (40 mg total) by mouth at bedtime. 90 tablet 1   ? clopidogreL (PLAVIX) 75 mg tablet Take 1 tablet (75 mg total) by mouth daily. 90 tablet 1   ? glucosamine-chondroit-vit C-Mn 500-400 mg cap Take 1 tablet by mouth daily.     ? hydrOXYzine pamoate (VISTARIL) 25 MG capsule Take 1 capsule (25 mg total) by mouth 3 (three) times a day as needed for anxiety. 21 capsule 0   ? [DISCONTINUED] atorvastatin (LIPITOR) 40 MG tablet Take 1 tablet (40 mg total) by mouth at bedtime. 90 tablet 0   ? [DISCONTINUED] clopidogreL (PLAVIX) 75 mg tablet Take 1 tablet (75 mg total) by mouth daily. 90 tablet 0     No facility-administered encounter medications on file as of  "2021.      Past Medical History:   Diagnosis Date   ? History of MI (myocardial infarction)    ? Stroke (H)      Past Surgical History:   Procedure Laterality Date   ? CAROTID ENDARTERECTOMY Left    ? MOUTH SURGERY       Social History     Tobacco Use   ? Smoking status: Former Smoker     Packs/day: 0.00     Years: 20.00     Pack years: 0.00     Quit date:      Years since quittin.2   ? Smokeless tobacco: Never Used   Substance Use Topics   ? Alcohol use: Yes     Alcohol/week: 3.0 standard drinks     Types: 3 Cans of beer per week   ? Drug use: Not Currently     Types: Marijuana     Family History   Problem Relation Age of Onset   ? Heart disease Mother    ? Hyperlipidemia Mother    ? Hypertension Mother    ? Diabetes Mother    ? Heart attack Father    ? Heart disease Father    ? Diabetes Brother    ? No Medical Problems Brother      Social History     Social History Narrative    Plays in a band.        Objective / Physical Examination:  Vitals:    21 0759   BP: 110/70   Patient Site: Right Arm   Patient Position: Sitting   Cuff Size: Adult Large   Pulse: (!) 59   Resp: 20   Temp: 97.4  F (36.3  C)   TempSrc: Temporal   SpO2: 97%   Weight: (!) 222 lb 4.8 oz (100.8 kg)   Height: 5' 10\" (1.778 m)     Wt Readings from Last 3 Encounters:   21 (!) 222 lb 4.8 oz (100.8 kg)   21 (!) 224 lb (101.6 kg)   20 220 lb (99.8 kg)     Body mass index is 31.9 kg/m .     General Appearance: Alert and oriented, cooperative, affect appropriate, speech clear, in no apparent distress  Head: Normocephalic, atraumatic  Eyes:  Conjunctivae clear and sclerae non-icteric  Neck: Supple, trachea midline. No cervical adenopathy  Lungs: Clear to auscultation bilaterally. No adventitious lung sounds noted. Normal inspiratory and expiratory effort  Cardiovascular: Regular rate, normal S1, S2. No murmurs, rubs, or gallops  Extremities: Pulses 2+ and equal throughout. No edema.   Integumentary: Warm and dry. " Without suspicious looking lesions  Neuro: Alert and oriented, follows commands appropriately.     No results found.  No results found for this or any previous visit (from the past 240 hour(s)).  Diagnostics:     Results for orders placed or performed during the hospital encounter of 12/20/20   Comprehensive Metabolic Panel   Result Value Ref Range    Sodium 139 136 - 145 mmol/L    Potassium 4.0 3.5 - 5.0 mmol/L    Chloride 102 98 - 107 mmol/L    CO2 23 22 - 31 mmol/L    Anion Gap, Calculation 14 5 - 18 mmol/L    Glucose 168 (H) 70 - 125 mg/dL    BUN 13 8 - 22 mg/dL    Creatinine 0.88 0.70 - 1.30 mg/dL    GFR MDRD Af Amer >60 >60 mL/min/1.73m2    GFR MDRD Non Af Amer >60 >60 mL/min/1.73m2    Bilirubin, Total 0.5 0.0 - 1.0 mg/dL    Calcium 8.9 8.5 - 10.5 mg/dL    Protein, Total 7.5 6.0 - 8.0 g/dL    Albumin 4.1 3.5 - 5.0 g/dL    Alkaline Phosphatase 120 45 - 120 U/L    AST 36 0 - 40 U/L    ALT 92 (H) 0 - 45 U/L   Lipase   Result Value Ref Range    Lipase 20 0 - 52 U/L   HM2 (CBC W/O DIFF)   Result Value Ref Range    WBC 7.9 4.0 - 11.0 thou/uL    RBC 5.30 4.40 - 6.20 mill/uL    Hemoglobin 16.1 14.0 - 18.0 g/dL    Hematocrit 46.5 40.0 - 54.0 %    MCV 88 80 - 100 fL    MCH 30.4 27.0 - 34.0 pg    MCHC 34.6 32.0 - 36.0 g/dL    RDW 11.9 11.0 - 14.5 %    Platelets 251 140 - 440 thou/uL    MPV 9.6 8.5 - 12.5 fL   Magnesium   Result Value Ref Range    Magnesium 2.1 1.8 - 2.6 mg/dL   Troponin I   Result Value Ref Range    Troponin I <0.01 0.00 - 0.29 ng/mL   Thyroid Stimulating Hormone (TSH)   Result Value Ref Range    TSH 1.88 0.30 - 5.00 uIU/mL   ECG 12 lead nursing unit performed   Result Value Ref Range    SYSTOLIC BLOOD PRESSURE      DIASTOLIC BLOOD PRESSURE      VENTRICULAR RATE 84 BPM    ATRIAL RATE 84 BPM    P-R INTERVAL 168 ms    QRS DURATION 90 ms    Q-T INTERVAL 358 ms    QTC CALCULATION (BEZET) 423 ms    P Axis 62 degrees    R AXIS 45 degrees    T AXIS 54 degrees    MUSE DIAGNOSIS       Sinus rhythm with occasional  Premature ventricular complexes  Otherwise normal ECG  When compared with ECG of 04-NOV-2009 22:51,  Premature ventricular complexes are now Present  Criteria for Inferior infarct are no longer Present  T wave inversion no longer evident in Inferior leads  Confirmed by SEE ED PROVIDER NOTE FOR, ECG INTERPRETATION ([id number]),  Freddy Hayes ([id number]) on 12/23/2020 1:47:53 PM         Quality review:     PHQ-2 Total Score: 3 (4/14/2021  7:56 AM)      PHQ-9 Total Score: 7 (4/14/2021  7:56 AM)        Radha Reynolds, [facility]  Internal Medicine  [address] [address]

## 2021-07-03 NOTE — ADDENDUM NOTE
Addendum Note by Minda Chen CMA at 12/17/2019 10:05 AM     Author: Minda Chen CMA Service: -- Author Type: Certified Medical Assistant    Filed: 12/17/2019 10:37 AM Encounter Date: 12/17/2019 Status: Signed    : Minda Chen CMA (Certified Medical Assistant)    Addended by: MINDA CHEN on: 12/17/2019 10:37 AM        Modules accepted: Orders

## 2021-10-11 ENCOUNTER — HEALTH MAINTENANCE LETTER (OUTPATIENT)
Age: 51
End: 2021-10-11

## 2021-10-28 NOTE — PROGRESS NOTES
SUBJECTIVE:   CC: David Macias is an 51 year old male who presents for preventative health visit.     {Split Bill scripting  The purpose of this visit is to discuss your medical history and prevent health problems before you are sick. You may be responsible for a co-pay, coinsurance, or deductible if your visit today includes services such as checking on a sore throat, having an x-ray or lab test, or treating and evaluating a new or existing condition :966482}  Patient has been advised of split billing requirements and indicates understanding: {Yes and No:665715}  HPI  {Add if <65 person on Medicare  - Required Questions (Optional):364162}  {Outside tests to abstract? :740055}    {additional problems to add (Optional):880577}    Today's PHQ-2 Score: No flowsheet data found.    Abuse: Current or Past(Physical, Sexual or Emotional)- { :204693}  Do you feel safe in your environment? { :042634}    Have you ever done Advance Care Planning? (For example, a Health Directive, POLST, or a discussion with a medical provider or your loved ones about your wishes): { :743881}    Social History     Tobacco Use     Smoking status: Former Smoker     Packs/day: 0.00     Years: 20.00     Pack years: 0.00     Quit date: 2009     Years since quittin.8     Smokeless tobacco: Never Used   Substance Use Topics     Alcohol use: Yes     Alcohol/week: 3.0 standard drinks     {Rooming Staff- Complete this question if Prescreen response is not shown below for today's visit. If you drink alcohol do you typically have >3 drinks per day or >7 drinks per week? (Optional):209609}    No flowsheet data found.{add AUDIT responses (Optional) (A score of 7 for adult men is an indication of hazardous drinking; a score of 8 or more is an indication of an alcohol use disorder.  A score of 7 or more for adult women is an indication of hazardous drinking or an alchohol use disorder):898230}    Last PSA:   Prostate Specific Antigen Screen   Date  "Value Ref Range Status   04/14/2021 0.4 0.0 - 2.5 ng/mL Final       Reviewed orders with patient. Reviewed health maintenance and updated orders accordingly - { :619532::\"Yes\"}  {Chronicprobdata (optional):700610}    Reviewed and updated as needed this visit by clinical staff                 Reviewed and updated as needed this visit by Provider                {HISTORY OPTIONS (Optional):238644}    Review of Systems  {MALE ROS (Optional):773892::\"CONSTITUTIONAL: NEGATIVE for fever, chills, change in weight\",\"INTEGUMENTARY/SKIN: NEGATIVE for worrisome rashes, moles or lesions\",\"EYES: NEGATIVE for vision changes or irritation\",\"ENT: NEGATIVE for ear, mouth and throat problems\",\"RESP: NEGATIVE for significant cough or SOB\",\"CV: NEGATIVE for chest pain, palpitations or peripheral edema\",\"GI: NEGATIVE for nausea, abdominal pain, heartburn, or change in bowel habits\",\" male: negative for dysuria, hematuria, decreased urinary stream, erectile dysfunction, urethral discharge\",\"MUSCULOSKELETAL: NEGATIVE for significant arthralgias or myalgia\",\"NEURO: NEGATIVE for weakness, dizziness or paresthesias\",\"PSYCHIATRIC: NEGATIVE for changes in mood or affect\"}    OBJECTIVE:   There were no vitals taken for this visit.    Physical Exam  {Exam Choices (Optional):811608}    {Diagnostic Test Results (Optional):019746::\"Diagnostic Test Results:\",\"Labs reviewed in Epic\"}    ASSESSMENT/PLAN:   {Diag Picklist:343736}    Patient has been advised of split billing requirements and indicates understanding: {YES / NO:637815::\"Yes\"}  COUNSELING:   {MALE COUNSELING MESSAGES:474308::\"Reviewed preventive health counseling, as reflected in patient instructions\"}    Estimated body mass index is 31.9 kg/m  as calculated from the following:    Height as of 4/14/21: 1.778 m (5' 10\").    Weight as of 4/14/21: 100.8 kg (222 lb 4.8 oz).     {Weight Management Plan (ACO) Complete if BMI is abnormal-  Ages 18-64  BMI >24.9.  Age 65+ with BMI <23 or >30 " (Optional):842089}    He reports that he quit smoking about 12 years ago. He smoked 0.00 packs per day for 20.00 years. He has never used smokeless tobacco.      Counseling Resources:  ATP IV Guidelines  Pooled Cohorts Equation Calculator  FRAX Risk Assessment  ICSI Preventive Guidelines  Dietary Guidelines for Americans, 2010  USDA's MyPlate  ASA Prophylaxis  Lung CA Screening    Radha Reynolds NP  [facility]

## 2021-10-29 ENCOUNTER — OFFICE VISIT (OUTPATIENT)
Dept: INTERNAL MEDICINE | Facility: CLINIC | Age: 51
End: 2021-10-29
Payer: COMMERCIAL

## 2021-10-29 VITALS
HEART RATE: 65 BPM | HEIGHT: 70 IN | OXYGEN SATURATION: 96 % | WEIGHT: 226.4 LBS | DIASTOLIC BLOOD PRESSURE: 64 MMHG | SYSTOLIC BLOOD PRESSURE: 118 MMHG | BODY MASS INDEX: 32.41 KG/M2

## 2021-10-29 DIAGNOSIS — I25.2 HISTORY OF HEART ATTACK: ICD-10-CM

## 2021-10-29 DIAGNOSIS — I25.10 CORONARY ARTERY DISEASE INVOLVING NATIVE HEART WITHOUT ANGINA PECTORIS, UNSPECIFIED VESSEL OR LESION TYPE: ICD-10-CM

## 2021-10-29 DIAGNOSIS — E11.9 TYPE 2 DIABETES MELLITUS WITHOUT COMPLICATION, WITHOUT LONG-TERM CURRENT USE OF INSULIN (H): ICD-10-CM

## 2021-10-29 DIAGNOSIS — Z00.00 ANNUAL PHYSICAL EXAM: Primary | ICD-10-CM

## 2021-10-29 DIAGNOSIS — E78.5 HYPERLIPIDEMIA LDL GOAL <100: ICD-10-CM

## 2021-10-29 DIAGNOSIS — Z87.891 FORMER SMOKER: ICD-10-CM

## 2021-10-29 DIAGNOSIS — Z86.73 HISTORY OF STROKE: ICD-10-CM

## 2021-10-29 LAB
ALBUMIN SERPL-MCNC: 4.1 G/DL (ref 3.5–5)
ALBUMIN UR-MCNC: ABNORMAL MG/DL
ALP SERPL-CCNC: 116 U/L (ref 45–120)
ALT SERPL W P-5'-P-CCNC: 38 U/L (ref 0–45)
ANION GAP SERPL CALCULATED.3IONS-SCNC: 12 MMOL/L (ref 5–18)
APPEARANCE UR: CLEAR
AST SERPL W P-5'-P-CCNC: 17 U/L (ref 0–40)
BACTERIA #/AREA URNS HPF: ABNORMAL /HPF
BASOPHILS # BLD AUTO: 0 10E3/UL (ref 0–0.2)
BASOPHILS NFR BLD AUTO: 1 %
BILIRUB SERPL-MCNC: 0.5 MG/DL (ref 0–1)
BILIRUB UR QL STRIP: NEGATIVE
BUN SERPL-MCNC: 13 MG/DL (ref 8–22)
CALCIUM SERPL-MCNC: 9.4 MG/DL (ref 8.5–10.5)
CHLORIDE BLD-SCNC: 106 MMOL/L (ref 98–107)
CHOLEST SERPL-MCNC: 158 MG/DL
CO2 SERPL-SCNC: 22 MMOL/L (ref 22–31)
COLOR UR AUTO: YELLOW
CREAT SERPL-MCNC: 0.86 MG/DL (ref 0.7–1.3)
EOSINOPHIL # BLD AUTO: 0.3 10E3/UL (ref 0–0.7)
EOSINOPHIL NFR BLD AUTO: 5 %
ERYTHROCYTE [DISTWIDTH] IN BLOOD BY AUTOMATED COUNT: 11.9 % (ref 10–15)
FASTING STATUS PATIENT QL REPORTED: YES
GFR SERPL CREATININE-BSD FRML MDRD: >90 ML/MIN/1.73M2
GLUCOSE BLD-MCNC: 170 MG/DL (ref 70–125)
GLUCOSE UR STRIP-MCNC: NEGATIVE MG/DL
HBA1C MFR BLD: 7.6 % (ref 0–5.6)
HCT VFR BLD AUTO: 45.6 % (ref 40–53)
HDLC SERPL-MCNC: 35 MG/DL
HGB BLD-MCNC: 15.6 G/DL (ref 13.3–17.7)
HGB UR QL STRIP: NEGATIVE
IMM GRANULOCYTES # BLD: 0 10E3/UL
IMM GRANULOCYTES NFR BLD: 0 %
KETONES UR STRIP-MCNC: NEGATIVE MG/DL
LDLC SERPL CALC-MCNC: 102 MG/DL
LEUKOCYTE ESTERASE UR QL STRIP: NEGATIVE
LYMPHOCYTES # BLD AUTO: 1.2 10E3/UL (ref 0.8–5.3)
LYMPHOCYTES NFR BLD AUTO: 21 %
MCH RBC QN AUTO: 30.1 PG (ref 26.5–33)
MCHC RBC AUTO-ENTMCNC: 34.2 G/DL (ref 31.5–36.5)
MCV RBC AUTO: 88 FL (ref 78–100)
MONOCYTES # BLD AUTO: 0.5 10E3/UL (ref 0–1.3)
MONOCYTES NFR BLD AUTO: 8 %
MUCOUS THREADS #/AREA URNS LPF: PRESENT /LPF
NEUTROPHILS # BLD AUTO: 3.7 10E3/UL (ref 1.6–8.3)
NEUTROPHILS NFR BLD AUTO: 65 %
NITRATE UR QL: NEGATIVE
PH UR STRIP: 5.5 [PH] (ref 5–8)
PLATELET # BLD AUTO: 239 10E3/UL (ref 150–450)
POTASSIUM BLD-SCNC: 4.6 MMOL/L (ref 3.5–5)
PROT SERPL-MCNC: 6.8 G/DL (ref 6–8)
RBC # BLD AUTO: 5.19 10E6/UL (ref 4.4–5.9)
RBC #/AREA URNS AUTO: ABNORMAL /HPF
SODIUM SERPL-SCNC: 140 MMOL/L (ref 136–145)
SP GR UR STRIP: >=1.03 (ref 1–1.03)
SQUAMOUS #/AREA URNS AUTO: ABNORMAL /LPF
TRIGL SERPL-MCNC: 107 MG/DL
UROBILINOGEN UR STRIP-ACNC: 0.2 E.U./DL
WBC # BLD AUTO: 5.7 10E3/UL (ref 4–11)
WBC #/AREA URNS AUTO: ABNORMAL /HPF

## 2021-10-29 PROCEDURE — 80061 LIPID PANEL: CPT | Performed by: NURSE PRACTITIONER

## 2021-10-29 PROCEDURE — 99396 PREV VISIT EST AGE 40-64: CPT | Performed by: NURSE PRACTITIONER

## 2021-10-29 PROCEDURE — 81001 URINALYSIS AUTO W/SCOPE: CPT | Performed by: NURSE PRACTITIONER

## 2021-10-29 PROCEDURE — 83036 HEMOGLOBIN GLYCOSYLATED A1C: CPT | Performed by: NURSE PRACTITIONER

## 2021-10-29 PROCEDURE — 85025 COMPLETE CBC W/AUTO DIFF WBC: CPT | Performed by: NURSE PRACTITIONER

## 2021-10-29 PROCEDURE — 36415 COLL VENOUS BLD VENIPUNCTURE: CPT | Performed by: NURSE PRACTITIONER

## 2021-10-29 PROCEDURE — 80053 COMPREHEN METABOLIC PANEL: CPT | Performed by: NURSE PRACTITIONER

## 2021-10-29 RX ORDER — LANCETS
EACH MISCELLANEOUS
Qty: 1 EACH | Refills: 3 | Status: SHIPPED | OUTPATIENT
Start: 2021-10-29 | End: 2022-07-28

## 2021-10-29 RX ORDER — ATORVASTATIN CALCIUM 40 MG/1
40 TABLET, FILM COATED ORAL AT BEDTIME
Qty: 90 TABLET | Refills: 3 | Status: SHIPPED | OUTPATIENT
Start: 2021-10-29 | End: 2022-07-28

## 2021-10-29 RX ORDER — METFORMIN HCL 500 MG
500 TABLET, EXTENDED RELEASE 24 HR ORAL
Qty: 90 TABLET | Refills: 1 | Status: SHIPPED | OUTPATIENT
Start: 2021-10-29 | End: 2022-02-21

## 2021-10-29 RX ORDER — GLUCOSAMINE HCL/CHONDROITIN SU 500-400 MG
CAPSULE ORAL
Qty: 100 EACH | Refills: 3 | Status: SHIPPED | OUTPATIENT
Start: 2021-10-29 | End: 2022-07-28

## 2021-10-29 RX ORDER — CLOPIDOGREL BISULFATE 75 MG/1
75 TABLET ORAL DAILY
Qty: 90 TABLET | Refills: 3 | Status: SHIPPED | OUTPATIENT
Start: 2021-10-29 | End: 2022-07-28

## 2021-10-29 RX ORDER — LANCETS
EACH MISCELLANEOUS
Qty: 100 EACH | Refills: 6 | Status: SHIPPED | OUTPATIENT
Start: 2021-10-29 | End: 2022-07-28

## 2021-10-29 RX ORDER — BLOOD-GLUCOSE METER
1 EACH MISCELLANEOUS ONCE
Qty: 1 KIT | Refills: 0 | Status: SHIPPED | OUTPATIENT
Start: 2021-10-29 | End: 2021-10-29

## 2021-10-29 ASSESSMENT — ANXIETY QUESTIONNAIRES
7. FEELING AFRAID AS IF SOMETHING AWFUL MIGHT HAPPEN: NOT AT ALL
2. NOT BEING ABLE TO STOP OR CONTROL WORRYING: NOT AT ALL
GAD7 TOTAL SCORE: 0
1. FEELING NERVOUS, ANXIOUS, OR ON EDGE: NOT AT ALL
6. BECOMING EASILY ANNOYED OR IRRITABLE: NOT AT ALL
GAD7 TOTAL SCORE: 0
GAD7 TOTAL SCORE: 0
4. TROUBLE RELAXING: NOT AT ALL
5. BEING SO RESTLESS THAT IT IS HARD TO SIT STILL: NOT AT ALL
8. IF YOU CHECKED OFF ANY PROBLEMS, HOW DIFFICULT HAVE THESE MADE IT FOR YOU TO DO YOUR WORK, TAKE CARE OF THINGS AT HOME, OR GET ALONG WITH OTHER PEOPLE?: NOT DIFFICULT AT ALL
7. FEELING AFRAID AS IF SOMETHING AWFUL MIGHT HAPPEN: NOT AT ALL
3. WORRYING TOO MUCH ABOUT DIFFERENT THINGS: NOT AT ALL

## 2021-10-29 ASSESSMENT — PATIENT HEALTH QUESTIONNAIRE - PHQ9
SUM OF ALL RESPONSES TO PHQ QUESTIONS 1-9: 0
SUM OF ALL RESPONSES TO PHQ QUESTIONS 1-9: 0
10. IF YOU CHECKED OFF ANY PROBLEMS, HOW DIFFICULT HAVE THESE PROBLEMS MADE IT FOR YOU TO DO YOUR WORK, TAKE CARE OF THINGS AT HOME, OR GET ALONG WITH OTHER PEOPLE: NOT DIFFICULT AT ALL

## 2021-10-29 ASSESSMENT — MIFFLIN-ST. JEOR: SCORE: 1897.57

## 2021-10-29 NOTE — PATIENT INSTRUCTIONS
Patient Education     Prevention Guidelines, Men Ages 40 to 49  Screening tests and vaccines are an important part of managing your health. A screening test is done to find possible disorders or diseases in people who don't have any symptoms. The goal is to find a disease early so lifestyle changes can be made and you can be watched more closely to reduce the risk of disease, or to detect it early enough to treat it most effectively. Screening tests are not considered diagnostic, but are used to determine if more testing is needed. Health counseling is essential, too. Below are guidelines for these, for men ages 40 to 49. Talk with your healthcare provider to make sure you re up to date on what you need.  Screening Who needs it How often   Alcohol misuse All men in this age group At routine exams   Blood pressure All men in this age group Yearly checkup if your blood pressure reading is normal  Normal blood pressure is less than 120/80 mm Hg  If your blood pressure is higher than normal, follow the advice of your healthcare provider      Depression All men in this age group At routine exams   Type 2 diabetes or prediabetes All men beginning at age 45 and men  without symptoms at any age who are overweight or obese and have 1 or more other risk factors for diabetes At least every 3 years (yearly if blood sugar has begun to rise)   Type 2 diabetes All men with prediabetes Every year   Hepatitis C Men at increased risk for infection - talk with your healthcare provider At routine exams   High cholesterol or triglycerides All men ages 35 and older, and younger men at high risk for coronary artery disease At least every 5 years   HIV All men At routine exams   Obesity All men in this age group At routine exams   Prostate cancer Starting at age 45, talk to healthcare provider about risks and benefits of digital rectal exam (TEO) and prostate-specific antigen (PSA) screening1 At routine exams   Syphilis Men at increased  risk for infection - talk with your healthcare provider At routine exams   Tuberculosis Men at increased risk for infection - talk with your healthcare provider Check with your healthcare provider   Vision All men in this age group Every 2 to 4 years if no risk factors for eye disease2   Vaccine Who needs it How often   Chickenpox (varicella) All men in this age group who have no record of this infection or vaccine 2 doses; the second dose should be given at least 4 weeks after the first dose   Hepatitis A Men at increased risk for infection - talk with your healthcare provider 2 doses given at least 6 months apart   Hepatitis B Men at increased risk for infection - talk with your healthcare provider 3 doses over 6 months; second dose should be given 1 month after the first dose; the third dose should be given at least 2 months after the second dose and at least 4 months after the first dose   Haemophilus influenzae Type B (HIB) Men at increased risk for infection - talk with your healthcare provider 1 to 3 doses   Influenza (flu) All men in this age group Once a year   Measles, mumps, rubella (MMR) All men in this age group who have no record of these infections or vaccines 1 or 2 doses   Meningococcal Men at increased risk for infection - talk with your healthcare provider 1 or more doses   Pneumococcal conjugate vaccine (PCV13) and pneumococcal polysaccharide vaccine (PPSV23) Men at increased risk for infection - talk with your healthcare provider PCV13: 1 dose ages 19 to 65 (protects against 13 types of pneumococcal bacteria)     PPSV23: 1 to 2 doses through age 64, or 1 dose at 65 or older (protects against 23 types of pneumococcal bacteria)      Tetanus/diphtheria/  pertussis (Td/Tdap) booster All men in this age group Td every 10 years, or a one-time dose of Tdap instead of a Td booster after age 18, then Td every 10 years   Counseling Who needs it How often   Diet and exercise Men who are overweight or obese  When diagnosed, and then at routine exams   Sexually transmitted infection prevention Men at increased risk for infection - talk with your healthcare provider At routine exams   Use of daily aspirin Men ages 45 to 79 at risk for cardiovascular health problems At routine exams   Use of tobacco and the health effects it can cause All men in this age group Every exam   [address] [place] [address] [facility]   2[facility] [employer] of [employer]  Akbar last reviewed this educational content on 2/1/2017 2000-2021 [employer]. All rights reserved. This information is not intended as a substitute for professional medical care. Always follow your healthcare professional's instructions.

## 2021-10-29 NOTE — PROGRESS NOTES
SUBJECTIVE:   CC: David Macias is an 51 year old male who presents for preventative health visit.     Patient has been advised of split billing requirements and indicates understanding: Yes  Healthy Habits:   PHQ-2 Total Score: 0    Answers for HPI/ROS submitted by the patient on 10/29/2021  If you checked off any problems, how difficult have these problems made it for you to do your work, take care of things at home, or get along with other people?: Not difficult at all  PHQ9 TOTAL SCORE: 0  ALYCE 7 TOTAL SCORE: 0  Wt Readings from Last 5 Encounters:   10/29/21 102.7 kg (226 lb 6.4 oz)   21 100.8 kg (222 lb 4.8 oz)   21 101.6 kg (224 lb)   20 98.4 kg (217 lb)   19 96.2 kg (212 lb)                   Today's PHQ-2 Score:   PHQ-2 (  [employer]) 10/29/2021   Q1: Little interest or pleasure in doing things 0   Q2: Feeling down, depressed or hopeless 0   PHQ-2 Score 0   Q1: Little interest or pleasure in doing things Not at all   Q2: Feeling down, depressed or hopeless Not at all   PHQ-2 Score 0       Abuse: Current or Past(Physical, Sexual or Emotional)- No  Do you feel safe in your environment? Yes    Have you ever done Advance Care Planning? (For example, a Health Directive, POLST, or a discussion with a medical provider or your loved ones about your wishes): No, advance care planning information given to patient to review.  Patient plans to discuss their wishes with loved ones or provider.      Social History     Tobacco Use     Smoking status: Former Smoker     Packs/day: 0.00     Years: 20.00     Pack years: 0.00     Quit date: 2009     Years since quittin.8     Smokeless tobacco: Never Used   Substance Use Topics     Alcohol use: Yes     Alcohol/week: 3.0 standard drinks     If you drink alcohol do you typically have >3 drinks per day or >7 drinks per week? No    Alcohol Use 10/29/2021   Prescreen: >3 drinks/day or >7 drinks/week? Not Applicable   Prescreen: >3 drinks/day or >7  drinks/week? -       Last PSA:   Prostate Specific Antigen Screen   Date Value Ref Range Status   2021 0.4 0.0 - 2.5 ng/mL Final       Reviewed orders with patient. Reviewed health maintenance and updated orders accordingly -   BP Readings from Last 3 Encounters:   10/29/21 118/64   21 110/70   21 128/80    Wt Readings from Last 3 Encounters:   10/29/21 102.7 kg (226 lb 6.4 oz)   21 100.8 kg (222 lb 4.8 oz)   21 101.6 kg (224 lb)                  Patient Active Problem List   Diagnosis     Diabetes mellitus, type 2 (H)     Past Surgical History:   Procedure Laterality Date     CAROTID ENDARTERECTOMY Left      MOUTH SURGERY         Social History     Tobacco Use     Smoking status: Former Smoker     Packs/day: 0.00     Years: 20.00     Pack years: 0.00     Quit date: 2009     Years since quittin.8     Smokeless tobacco: Never Used   Substance Use Topics     Alcohol use: Yes     Alcohol/week: 3.0 standard drinks     Family History   Problem Relation Age of Onset     Heart Disease Mother      Hyperlipidemia Mother      Hypertension Mother      Diabetes Mother      Coronary Artery Disease Father      Heart Disease Father      Diabetes Brother      No Known Problems Brother          Current Outpatient Medications   Medication Sig Dispense Refill     alcohol swab prep pads Use to swab area of injection/gisell as directed 100 each 3     alcohol swab prep pads Use to swab area of injection/gisell as directed. 100 each 3     aspirin 81 mg chewable tablet [ASPIRIN 81 MG CHEWABLE TABLET] Chew 81 mg.       atorvastatin (LIPITOR) 40 MG tablet Take 1 tablet (40 mg) by mouth At Bedtime 90 tablet 3     blood glucose (CONTOUR NEXT TEST) test strip Use to test blood sugar one times daily. 100 strip 4     blood glucose (NO BRAND SPECIFIED) test strip Use to test blood sugar 1 times daily or as directed. To accompany: Blood Glucose Monitor Brands: Bonnie Contour 100 strip 6     blood glucose (NO  BRAND SPECIFIED) test strip Use to test blood sugar 1 times daily or as directed. To accompany: 100 strip 6     blood glucose monitoring (NO BRAND SPECIFIED) meter device kit Use to test blood sugar 1 times daily or as directed. 1 kit 0     Blood Glucose Monitoring Suppl (CONTOUR NEXT ONE) KIT 1 each once for 1 dose 1 kit 0     clopidogrel (PLAVIX) 75 MG tablet Take 1 tablet (75 mg) by mouth daily 90 tablet 3     glucosamine-chondroit-vit C-Mn 500-400 mg cap [GLUCOSAMINE-CHONDROIT-VIT C--400 MG CAP] Take 1 tablet by mouth daily.       metFORMIN (GLUCOPHAGE-XR) 500 MG 24 hr tablet Take 1 tablet (500 mg) by mouth daily (with dinner) 90 tablet 1     thin (NO BRAND SPECIFIED) lancets Use to test blood sugar 1 times daily or as directed. 1 each 3     thin (NO BRAND SPECIFIED) lancets Use with lanceting device. 100 each 6     No Known Allergies  Recent Labs   Lab Test 04/14/21  0854 12/20/20  1708 09/02/20  0740 09/02/20  0740 12/20/19  0743 12/20/19  0743   A1C 6.7*  --   --  5.8*  --  5.9   LDL  --   --   --  81  --  93   HDL  --   --   --  40  --  45   TRIG  --   --   --  287*  --  127   ALT 64* 92*  --  53*   < > 51*   CR 0.88 0.88   < > 0.87   < > 0.86   GFRESTIMATED >60 >60   < > >60   < > >60   GFRESTBLACK >60 >60   < > >60   < > >60   POTASSIUM 4.6 4.0   < > 4.5   < > 4.2   TSH  --  1.88  --   --   --  1.79    < > = values in this interval not displayed.        Reviewed and updated as needed this visit by clinical staff  Tobacco  Allergies  Meds              Reviewed and updated as needed this visit by Provider                Past Medical History:   Diagnosis Date     History of MI (myocardial infarction)      Stroke (H)       Past Surgical History:   Procedure Laterality Date     CAROTID ENDARTERECTOMY Left      MOUTH SURGERY         Review of Systems  Unremarkable other than listed above and below    OBJECTIVE:   /64 (BP Location: Right arm, Patient Position: Sitting, Cuff Size: Adult Regular)    "Pulse 65   Ht 1.777 m (5' 9.96\")   Wt 102.7 kg (226 lb 6.4 oz)   SpO2 96%   BMI 32.52 kg/m      Physical Exam  GENERAL: healthy, alert and no distress  EYES: Eyes grossly normal to inspection, PERRL and conjunctivae and sclerae normal  HENT: ear canals and TM's normal, nose and mouth without ulcers or lesions  NECK: no adenopathy, no asymmetry, masses, or scars and thyroid normal to palpation  RESP: lungs clear to auscultation - no rales, rhonchi or wheezes  CV: regular rate and rhythm, normal S1 S2, no S3 or S4, no murmur, click or rub, no peripheral edema and peripheral pulses strong  ABDOMEN: soft, nontender, no hepatosplenomegaly, no masses and bowel sounds normal  MS: no gross musculoskeletal defects noted, no edema  SKIN: no suspicious lesions or rashes  NEURO: Normal strength and tone, mentation intact and speech normal  PSYCH: mentation appears normal, affect normal/bright        ASSESSMENT/PLAN:     Problem List Items Addressed This Visit        Endocrine    Diabetes mellitus, type 2 (H)    Relevant Medications    metFORMIN (GLUCOPHAGE-XR) 500 MG 24 hr tablet    Blood Glucose Monitoring Suppl (CONTOUR NEXT ONE) KIT    blood glucose (CONTOUR NEXT TEST) test strip    blood glucose (NO BRAND SPECIFIED) test strip    thin (NO BRAND SPECIFIED) lancets    alcohol swab prep pads    blood glucose monitoring (NO BRAND SPECIFIED) meter device kit    blood glucose (NO BRAND SPECIFIED) test strip    thin (NO BRAND SPECIFIED) lancets    alcohol swab prep pads    Other Relevant Orders    Comprehensive metabolic panel    CBC with platelets and differential    UA Macro with Reflex to Micro and Culture - lab collect    Hemoglobin A1c      Other Visit Diagnoses     Annual physical exam    -  Primary    Hyperlipidemia LDL goal <100        Relevant Medications    atorvastatin (LIPITOR) 40 MG tablet    clopidogrel (PLAVIX) 75 MG tablet    Other Relevant Orders    Lipid Profile (Chol, Trig, HDL, LDL calc)    History of heart " "attack        Coronary artery disease involving native heart without angina pectoris, unspecified vessel or lesion type        Former smoker        BMI 32.0-32.9,adult        History of stroke          Recommend increasing physical activity to 30 minutes most days of the week.  Patient is started on Metformin 500 mg daily recommend checking blood sugars 1 time per day.  Did discuss some nutritional changes.-Follow-up 6 months, sooner if needed.  All patient questions were supervised understanding and agree with plan.    Patient has been advised of split billing requirements and indicates understanding:   COUNSELING:   Reviewed preventive health counseling, as reflected in patient instructions       Regular exercise       Healthy diet/nutrition       Vision screening       Hearing screening    Estimated body mass index is 32.52 kg/m  as calculated from the following:    Height as of this encounter: 1.777 m (5' 9.96\").    Weight as of this encounter: 102.7 kg (226 lb 6.4 oz).     Weight management plan: Discussed healthy diet and exercise guidelines    He reports that he quit smoking about 12 years ago. He smoked 0.00 packs per day for 20.00 years. He has never used smokeless tobacco.      Counseling Resources:  ATP IV Guidelines  Pooled Cohorts Equation Calculator  FRAX Risk Assessment  ICSI Preventive Guidelines  Dietary Guidelines for Americans, 2010  USDA's MyPlate  ASA Prophylaxis  Lung CA Screening    Radha Reynolds [facility]  "

## 2021-10-30 ASSESSMENT — PATIENT HEALTH QUESTIONNAIRE - PHQ9: SUM OF ALL RESPONSES TO PHQ QUESTIONS 1-9: 0

## 2021-10-30 ASSESSMENT — ANXIETY QUESTIONNAIRES: GAD7 TOTAL SCORE: 0

## 2021-11-01 ENCOUNTER — TRANSFERRED RECORDS (OUTPATIENT)
Dept: HEALTH INFORMATION MANAGEMENT | Facility: CLINIC | Age: 51
End: 2021-11-01

## 2021-11-01 LAB — RETINOPATHY: NORMAL

## 2022-01-30 ENCOUNTER — HEALTH MAINTENANCE LETTER (OUTPATIENT)
Age: 52
End: 2022-01-30

## 2022-02-17 DIAGNOSIS — E11.9 TYPE 2 DIABETES MELLITUS WITHOUT COMPLICATION, WITHOUT LONG-TERM CURRENT USE OF INSULIN (H): ICD-10-CM

## 2022-02-17 NOTE — TELEPHONE ENCOUNTER
Received fax from pharmacy requesting new Rx for   metFORMIN (GLUCOPHAGE-XR) 500 MG 24 hr tablet    Last Refill: 10/29/2021  Last seen: 10/29/2021    Okay for refill?  Dagmar Wade MA on 2/17/2022 at 6:01 PM

## 2022-02-21 RX ORDER — METFORMIN HCL 500 MG
500 TABLET, EXTENDED RELEASE 24 HR ORAL
Qty: 90 TABLET | Refills: 1 | Status: SHIPPED | OUTPATIENT
Start: 2022-02-21 | End: 2022-07-28

## 2022-02-21 NOTE — TELEPHONE ENCOUNTER
"Routing refill request to provider for review/approval because:  Early refill requested.    Last Written Prescription Date:  10/29/21  Last Fill Quantity: 90,  # refills: 1   Last office visit provider:  10/29/21     Requested Prescriptions   Pending Prescriptions Disp Refills     metFORMIN (GLUCOPHAGE-XR) 500 MG 24 hr tablet 90 tablet 1     Sig: Take 1 tablet (500 mg) by mouth daily (with dinner)       Biguanide Agents Passed - 2/21/2022  7:11 AM        Passed - Patient is age 10 or older        Passed - Patient has documented A1c within the specified period of time.     If HgbA1C is 8 or greater, it needs to be on file within the past 3 months.  If less than 8, must be on file within the past 6 months.     Recent Labs   Lab Test 10/29/21  0832   A1C 7.6*             Passed - Patient's CR is NOT>1.4 OR Patient's EGFR is NOT<45 within past 12 mos.     Recent Labs   Lab Test 10/29/21  0832 04/14/21  0854   GFRESTIMATED >90 >60   GFRESTBLACK  --  >60       Recent Labs   Lab Test 10/29/21  0832   CR 0.86             Passed - Patient does NOT have a diagnosis of CHF.        Passed - Medication is active on med list        Passed - Recent (6 mo) or future (30 days) visit within the authorizing provider's specialty     Patient had office visit in the last 6 months or has a visit in the next 30 days with authorizing provider or within the authorizing provider's specialty.  See \"Patient Info\" tab in inbasket, or \"Choose Columns\" in Meds & Orders section of the refill encounter.                 Corbin Malloy RN 02/21/22 7:12 AM  "

## 2022-05-22 ENCOUNTER — HEALTH MAINTENANCE LETTER (OUTPATIENT)
Age: 52
End: 2022-05-22

## 2022-07-28 ENCOUNTER — OFFICE VISIT (OUTPATIENT)
Dept: INTERNAL MEDICINE | Facility: CLINIC | Age: 52
End: 2022-07-28
Payer: COMMERCIAL

## 2022-07-28 VITALS
HEIGHT: 70 IN | RESPIRATION RATE: 20 BRPM | WEIGHT: 205.9 LBS | HEART RATE: 64 BPM | DIASTOLIC BLOOD PRESSURE: 80 MMHG | SYSTOLIC BLOOD PRESSURE: 124 MMHG | TEMPERATURE: 98.3 F | BODY MASS INDEX: 29.48 KG/M2 | OXYGEN SATURATION: 99 %

## 2022-07-28 DIAGNOSIS — E66.3 OVERWEIGHT WITH BODY MASS INDEX (BMI) OF 29 TO 29.9 IN ADULT: ICD-10-CM

## 2022-07-28 DIAGNOSIS — I25.10 CORONARY ARTERY DISEASE INVOLVING NATIVE HEART WITHOUT ANGINA PECTORIS, UNSPECIFIED VESSEL OR LESION TYPE: ICD-10-CM

## 2022-07-28 DIAGNOSIS — Z11.4 SCREENING FOR HIV (HUMAN IMMUNODEFICIENCY VIRUS): ICD-10-CM

## 2022-07-28 DIAGNOSIS — E11.9 TYPE 2 DIABETES MELLITUS WITHOUT COMPLICATION, WITHOUT LONG-TERM CURRENT USE OF INSULIN (H): ICD-10-CM

## 2022-07-28 DIAGNOSIS — I25.2 HISTORY OF HEART ATTACK: ICD-10-CM

## 2022-07-28 DIAGNOSIS — Z00.00 ANNUAL PHYSICAL EXAM: Primary | ICD-10-CM

## 2022-07-28 DIAGNOSIS — E78.5 HYPERLIPIDEMIA LDL GOAL <100: ICD-10-CM

## 2022-07-28 DIAGNOSIS — Z12.5 SCREENING FOR PROSTATE CANCER: ICD-10-CM

## 2022-07-28 DIAGNOSIS — Z87.891 FORMER SMOKER: ICD-10-CM

## 2022-07-28 DIAGNOSIS — Z11.59 NEED FOR HEPATITIS C SCREENING TEST: ICD-10-CM

## 2022-07-28 LAB
ALBUMIN SERPL BCG-MCNC: 4.9 G/DL (ref 3.5–5.2)
ALP SERPL-CCNC: 100 U/L (ref 40–129)
ALT SERPL W P-5'-P-CCNC: 45 U/L (ref 10–50)
ANION GAP SERPL CALCULATED.3IONS-SCNC: 10 MMOL/L (ref 7–15)
AST SERPL W P-5'-P-CCNC: 39 U/L (ref 10–50)
BASOPHILS # BLD AUTO: 0 10E3/UL (ref 0–0.2)
BASOPHILS NFR BLD AUTO: 1 %
BILIRUB SERPL-MCNC: 0.7 MG/DL
BUN SERPL-MCNC: 17.3 MG/DL (ref 6–20)
CALCIUM SERPL-MCNC: 9.5 MG/DL (ref 8.6–10)
CHLORIDE SERPL-SCNC: 102 MMOL/L (ref 98–107)
CHOLEST SERPL-MCNC: 179 MG/DL
CREAT SERPL-MCNC: 0.87 MG/DL (ref 0.67–1.17)
CREAT UR-MCNC: 177 MG/DL
DEPRECATED HCO3 PLAS-SCNC: 25 MMOL/L (ref 22–29)
EOSINOPHIL # BLD AUTO: 0.3 10E3/UL (ref 0–0.7)
EOSINOPHIL NFR BLD AUTO: 5 %
ERYTHROCYTE [DISTWIDTH] IN BLOOD BY AUTOMATED COUNT: 12.1 % (ref 10–15)
GFR SERPL CREATININE-BSD FRML MDRD: >90 ML/MIN/1.73M2
GLUCOSE SERPL-MCNC: 118 MG/DL (ref 70–99)
HBA1C MFR BLD: 5.8 % (ref 0–5.6)
HCT VFR BLD AUTO: 46.1 % (ref 40–53)
HCV AB SERPL QL IA: NONREACTIVE
HDLC SERPL-MCNC: 48 MG/DL
HGB BLD-MCNC: 16 G/DL (ref 13.3–17.7)
HIV 1+2 AB+HIV1 P24 AG SERPL QL IA: NONREACTIVE
IMM GRANULOCYTES # BLD: 0 10E3/UL
IMM GRANULOCYTES NFR BLD: 0 %
LDLC SERPL CALC-MCNC: 114 MG/DL
LYMPHOCYTES # BLD AUTO: 1 10E3/UL (ref 0.8–5.3)
LYMPHOCYTES NFR BLD AUTO: 16 %
MCH RBC QN AUTO: 31.4 PG (ref 26.5–33)
MCHC RBC AUTO-ENTMCNC: 34.7 G/DL (ref 31.5–36.5)
MCV RBC AUTO: 90 FL (ref 78–100)
MICROALBUMIN UR-MCNC: 16.8 MG/L
MICROALBUMIN/CREAT UR: 9.49 MG/G CR (ref 0–17)
MONOCYTES # BLD AUTO: 0.4 10E3/UL (ref 0–1.3)
MONOCYTES NFR BLD AUTO: 7 %
NEUTROPHILS # BLD AUTO: 4.4 10E3/UL (ref 1.6–8.3)
NEUTROPHILS NFR BLD AUTO: 71 %
NONHDLC SERPL-MCNC: 131 MG/DL
PLATELET # BLD AUTO: 244 10E3/UL (ref 150–450)
POTASSIUM SERPL-SCNC: 4.1 MMOL/L (ref 3.4–5.3)
PROT SERPL-MCNC: 7.6 G/DL (ref 6.4–8.3)
PSA SERPL-MCNC: 0.49 NG/ML (ref 0–3.5)
RBC # BLD AUTO: 5.1 10E6/UL (ref 4.4–5.9)
SODIUM SERPL-SCNC: 137 MMOL/L (ref 136–145)
TRIGL SERPL-MCNC: 83 MG/DL
WBC # BLD AUTO: 6.2 10E3/UL (ref 4–11)

## 2022-07-28 PROCEDURE — 80053 COMPREHEN METABOLIC PANEL: CPT | Performed by: NURSE PRACTITIONER

## 2022-07-28 PROCEDURE — 99396 PREV VISIT EST AGE 40-64: CPT | Performed by: NURSE PRACTITIONER

## 2022-07-28 PROCEDURE — 85025 COMPLETE CBC W/AUTO DIFF WBC: CPT | Performed by: NURSE PRACTITIONER

## 2022-07-28 PROCEDURE — 86803 HEPATITIS C AB TEST: CPT | Performed by: NURSE PRACTITIONER

## 2022-07-28 PROCEDURE — 87389 HIV-1 AG W/HIV-1&-2 AB AG IA: CPT | Performed by: NURSE PRACTITIONER

## 2022-07-28 PROCEDURE — 36415 COLL VENOUS BLD VENIPUNCTURE: CPT | Performed by: NURSE PRACTITIONER

## 2022-07-28 PROCEDURE — 82043 UR ALBUMIN QUANTITATIVE: CPT | Performed by: NURSE PRACTITIONER

## 2022-07-28 PROCEDURE — G0103 PSA SCREENING: HCPCS | Performed by: NURSE PRACTITIONER

## 2022-07-28 PROCEDURE — 83036 HEMOGLOBIN GLYCOSYLATED A1C: CPT | Performed by: NURSE PRACTITIONER

## 2022-07-28 PROCEDURE — 80061 LIPID PANEL: CPT | Performed by: NURSE PRACTITIONER

## 2022-07-28 RX ORDER — METFORMIN HCL 500 MG
500 TABLET, EXTENDED RELEASE 24 HR ORAL
Qty: 90 TABLET | Refills: 3 | Status: SHIPPED | OUTPATIENT
Start: 2022-07-28 | End: 2023-07-27

## 2022-07-28 RX ORDER — ATORVASTATIN CALCIUM 40 MG/1
40 TABLET, FILM COATED ORAL AT BEDTIME
Qty: 90 TABLET | Refills: 3 | Status: SHIPPED | OUTPATIENT
Start: 2022-07-28 | End: 2023-07-27

## 2022-07-28 RX ORDER — CLOPIDOGREL BISULFATE 75 MG/1
75 TABLET ORAL DAILY
Qty: 90 TABLET | Refills: 3 | Status: SHIPPED | OUTPATIENT
Start: 2022-07-28 | End: 2023-07-27

## 2022-07-28 ASSESSMENT — ENCOUNTER SYMPTOMS
PARESTHESIAS: 0
FEVER: 0
CONSTIPATION: 0
ABDOMINAL PAIN: 0
CHILLS: 0
MYALGIAS: 1
DYSURIA: 0
DIZZINESS: 0
HEMATOCHEZIA: 0
EYE PAIN: 0
ARTHRALGIAS: 1
NAUSEA: 0
HEADACHES: 0
HEMATURIA: 0
NERVOUS/ANXIOUS: 0
HEARTBURN: 1
COUGH: 0
SHORTNESS OF BREATH: 0
JOINT SWELLING: 0
DIARRHEA: 0
WEAKNESS: 0
PALPITATIONS: 0
SORE THROAT: 0
FREQUENCY: 0

## 2022-07-28 ASSESSMENT — ANXIETY QUESTIONNAIRES
IF YOU CHECKED OFF ANY PROBLEMS ON THIS QUESTIONNAIRE, HOW DIFFICULT HAVE THESE PROBLEMS MADE IT FOR YOU TO DO YOUR WORK, TAKE CARE OF THINGS AT HOME, OR GET ALONG WITH OTHER PEOPLE: NOT DIFFICULT AT ALL
4. TROUBLE RELAXING: NOT AT ALL
1. FEELING NERVOUS, ANXIOUS, OR ON EDGE: NOT AT ALL
GAD7 TOTAL SCORE: 0
6. BECOMING EASILY ANNOYED OR IRRITABLE: NOT AT ALL
8. IF YOU CHECKED OFF ANY PROBLEMS, HOW DIFFICULT HAVE THESE MADE IT FOR YOU TO DO YOUR WORK, TAKE CARE OF THINGS AT HOME, OR GET ALONG WITH OTHER PEOPLE?: NOT DIFFICULT AT ALL
2. NOT BEING ABLE TO STOP OR CONTROL WORRYING: NOT AT ALL
GAD7 TOTAL SCORE: 0
GAD7 TOTAL SCORE: 0
7. FEELING AFRAID AS IF SOMETHING AWFUL MIGHT HAPPEN: NOT AT ALL
5. BEING SO RESTLESS THAT IT IS HARD TO SIT STILL: NOT AT ALL
7. FEELING AFRAID AS IF SOMETHING AWFUL MIGHT HAPPEN: NOT AT ALL
3. WORRYING TOO MUCH ABOUT DIFFERENT THINGS: NOT AT ALL

## 2022-07-28 ASSESSMENT — PATIENT HEALTH QUESTIONNAIRE - PHQ9
SUM OF ALL RESPONSES TO PHQ QUESTIONS 1-9: 0
10. IF YOU CHECKED OFF ANY PROBLEMS, HOW DIFFICULT HAVE THESE PROBLEMS MADE IT FOR YOU TO DO YOUR WORK, TAKE CARE OF THINGS AT HOME, OR GET ALONG WITH OTHER PEOPLE: NOT DIFFICULT AT ALL
SUM OF ALL RESPONSES TO PHQ QUESTIONS 1-9: 0

## 2022-07-28 ASSESSMENT — PAIN SCALES - GENERAL: PAINLEVEL: NO PAIN (0)

## 2022-07-28 NOTE — PROGRESS NOTES
SUBJECTIVE:   CC: David Macias is an 52 year old male who presents for preventative health visit.     Healthy Habits:     Getting at least 3 servings of Calcium per day:  Yes    Bi-annual eye exam:  Yes    Dental care twice a year:  Yes    Sleep apnea or symptoms of sleep apnea:  None    Diet:  Diabetic and Carbohydrate counting    Frequency of exercise:  2-3 days/week    Duration of exercise:  Greater than 60 minutes    Taking medications regularly:  Yes    Medication side effects:  None    PHQ-2 Total Score: 0    Additional concerns today:  No    Answers for HPI/ROS submitted by the patient on 7/28/2022  If you checked off any problems, how difficult have these problems made it for you to do your work, take care of things at home, or get along with other people?: Not difficult at all  PHQ9 TOTAL SCORE: 0  ALYCE 7 TOTAL SCORE: 0      Eye exam with ophthalmology on this date: 11/01/2021          Today's PHQ-2 Score:   PHQ-2 ( 1999 [employer]) 7/28/2022   Q1: Little interest or pleasure in doing things 0   Q2: Feeling down, depressed or hopeless 0   PHQ-2 Score 0   PHQ-2 Total Score (12-17 Years)- Positive if 3 or more points; Administer PHQ-A if positive -   Q1: Little interest or pleasure in doing things Not at all   Q2: Feeling down, depressed or hopeless Not at all   PHQ-2 Score 0       Current Outpatient Medications:      aspirin 81 mg chewable tablet, [ASPIRIN 81 MG CHEWABLE TABLET] Chew 81 mg., Disp: , Rfl:      atorvastatin (LIPITOR) 40 MG tablet, Take 1 tablet (40 mg) by mouth At Bedtime, Disp: 90 tablet, Rfl: 3     clopidogrel (PLAVIX) 75 MG tablet, Take 1 tablet (75 mg) by mouth daily, Disp: 90 tablet, Rfl: 3     glucosamine-chondroit-vit C-Mn 500-400 mg cap, [GLUCOSAMINE-CHONDROIT-VIT C--400 MG CAP] Take 1 tablet by mouth daily., Disp: , Rfl:      metFORMIN (GLUCOPHAGE XR) 500 MG 24 hr tablet, Take 1 tablet (500 mg) by mouth daily (with dinner), Disp: 90 tablet, Rfl: 3  Past Medical History:   Diagnosis  Date     History of MI (myocardial infarction)      Stroke (H)      Wt Readings from Last 5 Encounters:   22 93.4 kg (205 lb 14.4 oz)   10/29/21 102.7 kg (226 lb 6.4 oz)   21 100.8 kg (222 lb 4.8 oz)   21 101.6 kg (224 lb)   20 98.4 kg (217 lb)       Abuse: Current or Past(Physical, Sexual or Emotional)- No  Do you feel safe in your environment? Yes        Social History     Tobacco Use     Smoking status: Former Smoker     Packs/day: 0.00     Years: 20.00     Pack years: 0.00     Quit date: 2009     Years since quittin.5     Smokeless tobacco: Never Used   Substance Use Topics     Alcohol use: Yes     Alcohol/week: 3.0 standard drinks         Alcohol Use 2022   Prescreen: >3 drinks/day or >7 drinks/week? No   Prescreen: >3 drinks/day or >7 drinks/week? -       Last PSA:   Prostate Specific Antigen Screen   Date Value Ref Range Status   2021 0.4 0.0 - 2.5 ng/mL Final       Reviewed orders with patient. Reviewed health maintenance and updated orders accordingly - Yes  BP Readings from Last 3 Encounters:   22 124/80   10/29/21 118/64   21 110/70    Wt Readings from Last 3 Encounters:   22 93.4 kg (205 lb 14.4 oz)   10/29/21 102.7 kg (226 lb 6.4 oz)   21 100.8 kg (222 lb 4.8 oz)                  Patient Active Problem List   Diagnosis     Diabetes mellitus, type 2 (H)     Past Surgical History:   Procedure Laterality Date     CAROTID ENDARTERECTOMY Left      MOUTH SURGERY         Social History     Tobacco Use     Smoking status: Former Smoker     Packs/day: 0.00     Years: 20.00     Pack years: 0.00     Quit date: 2009     Years since quittin.5     Smokeless tobacco: Never Used   Substance Use Topics     Alcohol use: Yes     Alcohol/week: 3.0 standard drinks     Family History   Problem Relation Age of Onset     Heart Disease Mother      Hyperlipidemia Mother      Hypertension Mother      Diabetes Mother      Coronary Artery Disease Father       Heart Disease Father      Diabetes Brother      No Known Problems Brother          Current Outpatient Medications   Medication Sig Dispense Refill     aspirin 81 mg chewable tablet [ASPIRIN 81 MG CHEWABLE TABLET] Chew 81 mg.       atorvastatin (LIPITOR) 40 MG tablet Take 1 tablet (40 mg) by mouth At Bedtime 90 tablet 3     clopidogrel (PLAVIX) 75 MG tablet Take 1 tablet (75 mg) by mouth daily 90 tablet 3     glucosamine-chondroit-vit C-Mn 500-400 mg cap [GLUCOSAMINE-CHONDROIT-VIT C--400 MG CAP] Take 1 tablet by mouth daily.       metFORMIN (GLUCOPHAGE XR) 500 MG 24 hr tablet Take 1 tablet (500 mg) by mouth daily (with dinner) 90 tablet 3     No Known Allergies  Recent Labs   Lab Test 10/29/21  0832 04/14/21  0854 12/20/20  1708 09/02/20  0740 12/20/19  0743   A1C 7.6* 6.7*  --  5.8* 5.9     --   --  81 93   HDL 35*  --   --  40 45   TRIG 107  --   --  287* 127   ALT 38 64* 92* 53* 51*   CR 0.86 0.88 0.88 0.87 0.86   GFRESTIMATED >90 >60 >60 >60 >60   GFRESTBLACK  --  >60 >60 >60 >60   POTASSIUM 4.6 4.6 4.0 4.5 4.2   TSH  --   --  1.88  --  1.79        Reviewed and updated as needed this visit by clinical staff   Tobacco  Allergies  Meds   Med Hx  Surg Hx   Soc Hx          Reviewed and updated as needed this visit by Provider                   Past Medical History:   Diagnosis Date     History of MI (myocardial infarction)      Stroke (H)       Past Surgical History:   Procedure Laterality Date     CAROTID ENDARTERECTOMY Left      MOUTH SURGERY         Review of Systems   Constitutional: Negative for chills and fever.   HENT: Negative for congestion, ear pain, hearing loss and sore throat.    Eyes: Negative for pain and visual disturbance.   Respiratory: Negative for cough and shortness of breath.    Cardiovascular: Negative for chest pain, palpitations and peripheral edema.   Gastrointestinal: Positive for heartburn. Negative for abdominal pain, constipation, diarrhea, hematochezia and nausea.  "  Genitourinary: Negative for dysuria, frequency, genital sores, hematuria, impotence, penile discharge and urgency.   Musculoskeletal: Positive for arthralgias and myalgias. Negative for joint swelling.   Skin: Negative for rash.   Neurological: Negative for dizziness, weakness, headaches and paresthesias.   Psychiatric/Behavioral: Positive for mood changes. The patient is not nervous/anxious.          OBJECTIVE:   /80 (BP Location: Right arm, Patient Position: Sitting, Cuff Size: Adult Regular)   Pulse 64   Temp 98.3  F (36.8  C) (Oral)   Resp 20   Ht 1.77 m (5' 9.69\")   Wt 93.4 kg (205 lb 14.4 oz)   SpO2 99%   BMI 29.81 kg/m      Physical Exam  GENERAL: healthy, alert and no distress  EYES: Eyes grossly normal to inspection, PERRL and conjunctivae and sclerae normal  HENT: ear canals and TM's normal, nose and mouth without ulcers or lesions  NECK: no adenopathy, no asymmetry, masses, or scars and thyroid normal to palpation  RESP: lungs clear to auscultation - no rales, rhonchi or wheezes  CV: regular rate and rhythm, normal S1 S2 no peripheral edema and peripheral pulses strong  ABDOMEN: soft, nontender, no hepatosplenomegaly, no masses and bowel sounds normal  MS: no gross musculoskeletal defects noted, no edema  SKIN: no suspicious lesions or rashes  NEURO: Normal strength and tone, mentation intact and speech normal  PSYCH: mentation appears normal, affect normal/bright  LYMPH: no cervical, supraclavicularadenopathy    Diagnostic Test Results:  Labs reviewed in Epic  No results found for this or any previous visit (from the past 24 hour(s)).  No results found for any visits on 07/28/22.    ASSESSMENT/PLAN:     Problem List Items Addressed This Visit        Endocrine    Diabetes mellitus, type 2 (H)    Relevant Medications    metFORMIN (GLUCOPHAGE XR) 500 MG 24 hr tablet    Other Relevant Orders    Albumin Random Urine Quantitative with Creat Ratio    HEMOGLOBIN A1C    Comprehensive metabolic " "panel    CBC with platelets and differential      Other Visit Diagnoses     Annual physical exam    -  Primary    Screening for HIV (human immunodeficiency virus)        Relevant Orders    HIV Antigen Antibody Combo    Need for hepatitis C screening test        Relevant Orders    Hepatitis C Screen Reflex to HCV RNA Quant and Genotype    Hyperlipidemia LDL goal <100        Relevant Medications    atorvastatin (LIPITOR) 40 MG tablet    clopidogrel (PLAVIX) 75 MG tablet    Other Relevant Orders    Lipid Profile (Chol, Trig, HDL, LDL calc)    Coronary artery disease involving native heart without angina pectoris, unspecified vessel or lesion type        Former smoker        History of heart attack        Screening for prostate cancer        Relevant Orders    PSA, screen    Overweight with body mass index (BMI) of 29 to 29.9 in adult            Patient is congratulated on his significant weight loss and lifestyle changes he will follow-up in 6 months for med check sooner if needed      COUNSELING:   Reviewed preventive health counseling, as reflected in patient instructions       Regular exercise       Healthy diet/nutrition       Vision screening    Estimated body mass index is 29.81 kg/m  as calculated from the following:    Height as of this encounter: 1.77 m (5' 9.69\").    Weight as of this encounter: 93.4 kg (205 lb 14.4 oz).     Weight management plan: Discussed healthy diet and exercise guidelines    He reports that he quit smoking about 13 years ago. He smoked 0.00 packs per day for 20.00 years. He has never used smokeless tobacco.      Counseling Resources:  ATP IV Guidelines  Pooled Cohorts Equation Calculator  FRAX Risk Assessment  ICSI Preventive Guidelines  Dietary Guidelines for Americans, 2010  USDA's MyPlate  ASA Prophylaxis  Lung CA Screening    Radha Reynolds NP  [facility]  "

## 2022-07-28 NOTE — Clinical Note
Please abstract the following data from this visit with this patient into the appropriate field in Epic:  Tests that can be patient reported without a hard copy:  Eye exam with ophthalmology on this date: around 11/01/2021  Other Tests found in the patient's chart through Chart Review/Care Everywhere:  {Abstract Quality List (Optional):221835}  Note to Abstraction: If this section is blank, no results were found via Chart Review/Care Everywhere.

## 2022-09-24 ENCOUNTER — HEALTH MAINTENANCE LETTER (OUTPATIENT)
Age: 52
End: 2022-09-24

## 2023-01-01 ENCOUNTER — TRANSFERRED RECORDS (OUTPATIENT)
Dept: MULTI SPECIALTY CLINIC | Facility: CLINIC | Age: 53
End: 2023-01-01

## 2023-01-01 LAB — RETINOPATHY: NORMAL

## 2023-01-29 ENCOUNTER — HEALTH MAINTENANCE LETTER (OUTPATIENT)
Age: 53
End: 2023-01-29

## 2023-01-31 ENCOUNTER — TELEPHONE (OUTPATIENT)
Dept: INTERNAL MEDICINE | Facility: CLINIC | Age: 53
End: 2023-01-31
Payer: COMMERCIAL

## 2023-02-01 NOTE — TELEPHONE ENCOUNTER
"Reason for Call:  Other returning call    Detailed comments:   Pt calling to cancel an appt scheduled for 2/1//23 that shows \"arrived\" I do not have authority to cx appt, once arrived. However; he prefers in person visit. I did schedule him for an appt w/ PCP Azeb Reynolds NP on Friday 02/03/23. He would also like fasting labs at his appt. Please call and discuss message from nurse, per pt. Ty/kt  Phone Number Patient can be reached at: Cell number on file:    Telephone Information:   Mobile [phone]       Best Time: anytime    Can we leave a detailed message on this number? YES    Call taken on 1/31/2023 at 9:00 PM by Emily Galloway    "

## 2023-05-08 ENCOUNTER — HEALTH MAINTENANCE LETTER (OUTPATIENT)
Age: 53
End: 2023-05-08

## 2023-07-26 ASSESSMENT — ENCOUNTER SYMPTOMS
NAUSEA: 0
HEADACHES: 0
CONSTIPATION: 0
WEAKNESS: 0
NERVOUS/ANXIOUS: 0
HEMATOCHEZIA: 0
FEVER: 0
JOINT SWELLING: 0
EYE PAIN: 0
DYSURIA: 0
HEMATURIA: 0
ARTHRALGIAS: 0
SORE THROAT: 0
HEARTBURN: 0
DIZZINESS: 0
PARESTHESIAS: 0
ABDOMINAL PAIN: 0
CHILLS: 0
DIARRHEA: 0
SHORTNESS OF BREATH: 0
COUGH: 0
PALPITATIONS: 0
MYALGIAS: 0
FREQUENCY: 0

## 2023-07-27 DIAGNOSIS — E78.5 HYPERLIPIDEMIA LDL GOAL <100: ICD-10-CM

## 2023-07-27 DIAGNOSIS — E11.9 TYPE 2 DIABETES MELLITUS WITHOUT COMPLICATION, WITHOUT LONG-TERM CURRENT USE OF INSULIN (H): ICD-10-CM

## 2023-07-27 RX ORDER — ATORVASTATIN CALCIUM 40 MG/1
40 TABLET, FILM COATED ORAL AT BEDTIME
Qty: 90 TABLET | Refills: 0 | Status: SHIPPED | OUTPATIENT
Start: 2023-07-27 | End: 2023-08-01

## 2023-07-27 RX ORDER — METFORMIN HCL 500 MG
500 TABLET, EXTENDED RELEASE 24 HR ORAL
Qty: 90 TABLET | Refills: 3 | Status: SHIPPED | OUTPATIENT
Start: 2023-07-27 | End: 2023-08-02

## 2023-07-27 RX ORDER — CLOPIDOGREL BISULFATE 75 MG/1
75 TABLET ORAL DAILY
Qty: 90 TABLET | Refills: 0 | Status: SHIPPED | OUTPATIENT
Start: 2023-07-27 | End: 2023-08-01

## 2023-07-27 NOTE — TELEPHONE ENCOUNTER
"    Last Written Prescription Date:  7/28/2022  Last Fill Quantity: 90,  # refills: 3   Last office visit provider:  7/28/2022     Requested Prescriptions   Pending Prescriptions Disp Refills    atorvastatin (LIPITOR) 40 MG tablet 90 tablet 3     Sig: Take 1 tablet (40 mg) by mouth At Bedtime       Statins Protocol Passed - 7/27/2023  9:49 AM        Passed - LDL on file in past 12 months     Recent Labs   Lab Test 07/28/22  1040   *             Passed - No abnormal creatine kinase in past 12 months     No lab results found.             Passed - Recent (12 mo) or future (30 days) visit within the authorizing provider's specialty     Patient has had an office visit with the authorizing provider or a provider within the authorizing providers department within the previous 12 mos or has a future within next 30 days. See \"Patient Info\" tab in inbasket, or \"Choose Columns\" in Meds & Orders section of the refill encounter.              Passed - Medication is active on med list        Passed - Patient is age 18 or older          Last Written Prescription Date:  7/28/2022  Last Fill Quantity: 90,  # refills: 3   Last office visit provider:  7/28/2022       clopidogrel (PLAVIX) 75 MG tablet 90 tablet 3     Sig: Take 1 tablet (75 mg) by mouth daily       Plavix Passed - 7/27/2023  9:49 AM        Passed - No active PPI on record unless is Protonix        Passed - Normal HGB on file in past 12 months     Recent Labs   Lab Test 07/28/22  1040   HGB 16.0               Passed - Normal Platelets on file in past 12 months     Recent Labs   Lab Test 07/28/22  1040                  Passed - Recent (12 mo) or future (30 days) visit within the authorizing provider's specialty     Patient has had an office visit with the authorizing provider or a provider within the authorizing providers department within the previous 12 mos or has a future within next 30 days. See \"Patient Info\" tab in inbasket, or \"Choose Columns\" in Meds " "& Orders section of the refill encounter.              Passed - Medication is active on med list        Passed - Patient is age 18 or older        Routing refill request to provider for review/approval because:  Failed - Patient has documented A1c within the specified period of time.    Last Written Prescription Date:  7/28/2022  Last Fill Quantity: 90,  # refills: 3   Last office visit provider:  7/28/2022         metFORMIN (GLUCOPHAGE XR) 500 MG 24 hr tablet 90 tablet 3     Sig: Take 1 tablet (500 mg) by mouth daily (with dinner)       Biguanide Agents Failed - 7/27/2023  9:49 AM        Failed - Patient has documented A1c within the specified period of time.     If HgbA1C is 8 or greater, it needs to be on file within the past 3 months.  If less than 8, must be on file within the past 6 months.     Recent Labs   Lab Test 07/28/22  1040   A1C 5.8*             Passed - Patient is age 10 or older        Passed - Patient's CR is NOT>1.4 OR Patient's EGFR is NOT<45 within past 12 mos.     Recent Labs   Lab Test 07/28/22  1040 10/29/21  0832 04/14/21  0854   GFRESTIMATED >90   < > >60   GFRESTBLACK  --   --  >60    < > = values in this interval not displayed.       Recent Labs   Lab Test 07/28/22  1040   CR 0.87             Passed - Patient does NOT have a diagnosis of CHF.        Passed - Medication is active on med list        Passed - Recent (6 mo) or future (30 days) visit within the authorizing provider's specialty     Patient had office visit in the last 6 months or has a visit in the next 30 days with authorizing provider or within the authorizing provider's specialty.  See \"Patient Info\" tab in inbasket, or \"Choose Columns\" in Meds & Orders section of the refill encounter.                 Martita Henriquez RN 07/27/23 2:56 PM  "

## 2023-07-27 NOTE — TELEPHONE ENCOUNTER
Received fax from pharmacy requesting refill for metformin, clopidogrel, atorvastatin    Last refill: 07/28/2022  Patient last seen: 07/28/2022    Future appointment: 08/02/2023

## 2023-08-02 ENCOUNTER — OFFICE VISIT (OUTPATIENT)
Dept: INTERNAL MEDICINE | Facility: CLINIC | Age: 53
End: 2023-08-02
Payer: COMMERCIAL

## 2023-08-02 VITALS
HEART RATE: 76 BPM | SYSTOLIC BLOOD PRESSURE: 128 MMHG | DIASTOLIC BLOOD PRESSURE: 78 MMHG | OXYGEN SATURATION: 96 % | WEIGHT: 226.7 LBS | BODY MASS INDEX: 32.82 KG/M2 | RESPIRATION RATE: 17 BRPM

## 2023-08-02 DIAGNOSIS — Z86.73 HISTORY OF STROKE: ICD-10-CM

## 2023-08-02 DIAGNOSIS — Z87.891 FORMER SMOKER: ICD-10-CM

## 2023-08-02 DIAGNOSIS — Z12.11 SCREEN FOR COLON CANCER: ICD-10-CM

## 2023-08-02 DIAGNOSIS — Z00.00 ANNUAL PHYSICAL EXAM: Primary | ICD-10-CM

## 2023-08-02 DIAGNOSIS — Z12.5 SCREENING FOR PROSTATE CANCER: ICD-10-CM

## 2023-08-02 DIAGNOSIS — E11.9 TYPE 2 DIABETES MELLITUS WITHOUT COMPLICATION, WITHOUT LONG-TERM CURRENT USE OF INSULIN (H): ICD-10-CM

## 2023-08-02 DIAGNOSIS — I25.2 HISTORY OF HEART ATTACK: ICD-10-CM

## 2023-08-02 DIAGNOSIS — E78.5 HYPERLIPIDEMIA LDL GOAL <100: ICD-10-CM

## 2023-08-02 DIAGNOSIS — I25.10 CORONARY ARTERY DISEASE INVOLVING NATIVE HEART WITHOUT ANGINA PECTORIS, UNSPECIFIED VESSEL OR LESION TYPE: ICD-10-CM

## 2023-08-02 LAB
ALBUMIN SERPL BCG-MCNC: 4.8 G/DL (ref 3.5–5.2)
ALP SERPL-CCNC: 116 U/L (ref 40–129)
ALT SERPL W P-5'-P-CCNC: 53 U/L (ref 0–70)
ANION GAP SERPL CALCULATED.3IONS-SCNC: 12 MMOL/L (ref 7–15)
AST SERPL W P-5'-P-CCNC: 29 U/L (ref 0–45)
BASOPHILS # BLD AUTO: 0 10E3/UL (ref 0–0.2)
BASOPHILS NFR BLD AUTO: 1 %
BILIRUB SERPL-MCNC: 0.5 MG/DL
BUN SERPL-MCNC: 15.5 MG/DL (ref 6–20)
CALCIUM SERPL-MCNC: 9.7 MG/DL (ref 8.6–10)
CHLORIDE SERPL-SCNC: 101 MMOL/L (ref 98–107)
CHOLEST SERPL-MCNC: 160 MG/DL
CREAT SERPL-MCNC: 0.82 MG/DL (ref 0.67–1.17)
CREAT UR-MCNC: 165 MG/DL
DEPRECATED HCO3 PLAS-SCNC: 24 MMOL/L (ref 22–29)
EOSINOPHIL # BLD AUTO: 0.3 10E3/UL (ref 0–0.7)
EOSINOPHIL NFR BLD AUTO: 5 %
ERYTHROCYTE [DISTWIDTH] IN BLOOD BY AUTOMATED COUNT: 12.1 % (ref 10–15)
GFR SERPL CREATININE-BSD FRML MDRD: >90 ML/MIN/1.73M2
GLUCOSE SERPL-MCNC: 199 MG/DL (ref 70–99)
HBA1C MFR BLD: 8.3 % (ref 0–5.6)
HCT VFR BLD AUTO: 45.2 % (ref 40–53)
HDLC SERPL-MCNC: 38 MG/DL
HGB BLD-MCNC: 15.9 G/DL (ref 13.3–17.7)
IMM GRANULOCYTES # BLD: 0 10E3/UL
IMM GRANULOCYTES NFR BLD: 0 %
LDLC SERPL CALC-MCNC: 90 MG/DL
LYMPHOCYTES # BLD AUTO: 1.3 10E3/UL (ref 0.8–5.3)
LYMPHOCYTES NFR BLD AUTO: 19 %
MCH RBC QN AUTO: 30.2 PG (ref 26.5–33)
MCHC RBC AUTO-ENTMCNC: 35.2 G/DL (ref 31.5–36.5)
MCV RBC AUTO: 86 FL (ref 78–100)
MICROALBUMIN UR-MCNC: 15.8 MG/L
MICROALBUMIN/CREAT UR: 9.58 MG/G CR (ref 0–17)
MONOCYTES # BLD AUTO: 0.5 10E3/UL (ref 0–1.3)
MONOCYTES NFR BLD AUTO: 7 %
NEUTROPHILS # BLD AUTO: 4.5 10E3/UL (ref 1.6–8.3)
NEUTROPHILS NFR BLD AUTO: 68 %
NONHDLC SERPL-MCNC: 122 MG/DL
PLATELET # BLD AUTO: 237 10E3/UL (ref 150–450)
POTASSIUM SERPL-SCNC: 4.1 MMOL/L (ref 3.4–5.3)
PROT SERPL-MCNC: 7.5 G/DL (ref 6.4–8.3)
PSA SERPL DL<=0.01 NG/ML-MCNC: 0.43 NG/ML (ref 0–3.5)
RBC # BLD AUTO: 5.26 10E6/UL (ref 4.4–5.9)
SODIUM SERPL-SCNC: 137 MMOL/L (ref 136–145)
TRIGL SERPL-MCNC: 158 MG/DL
WBC # BLD AUTO: 6.6 10E3/UL (ref 4–11)

## 2023-08-02 PROCEDURE — 82570 ASSAY OF URINE CREATININE: CPT | Performed by: NURSE PRACTITIONER

## 2023-08-02 PROCEDURE — 80053 COMPREHEN METABOLIC PANEL: CPT | Performed by: NURSE PRACTITIONER

## 2023-08-02 PROCEDURE — 83036 HEMOGLOBIN GLYCOSYLATED A1C: CPT | Performed by: NURSE PRACTITIONER

## 2023-08-02 PROCEDURE — 99396 PREV VISIT EST AGE 40-64: CPT | Performed by: NURSE PRACTITIONER

## 2023-08-02 PROCEDURE — 36415 COLL VENOUS BLD VENIPUNCTURE: CPT | Performed by: NURSE PRACTITIONER

## 2023-08-02 PROCEDURE — 85025 COMPLETE CBC W/AUTO DIFF WBC: CPT | Performed by: NURSE PRACTITIONER

## 2023-08-02 PROCEDURE — G0103 PSA SCREENING: HCPCS | Performed by: NURSE PRACTITIONER

## 2023-08-02 PROCEDURE — 80061 LIPID PANEL: CPT | Performed by: NURSE PRACTITIONER

## 2023-08-02 PROCEDURE — 82043 UR ALBUMIN QUANTITATIVE: CPT | Performed by: NURSE PRACTITIONER

## 2023-08-02 RX ORDER — CLOPIDOGREL BISULFATE 75 MG/1
75 TABLET ORAL DAILY
Qty: 90 TABLET | Refills: 3 | Status: SHIPPED | OUTPATIENT
Start: 2023-08-02 | End: 2024-08-21

## 2023-08-02 RX ORDER — METFORMIN HCL 500 MG
500 TABLET, EXTENDED RELEASE 24 HR ORAL
Qty: 90 TABLET | Refills: 3 | Status: SHIPPED | OUTPATIENT
Start: 2023-08-02 | End: 2023-08-28 | Stop reason: DRUGHIGH

## 2023-08-02 RX ORDER — ATORVASTATIN CALCIUM 40 MG/1
40 TABLET, FILM COATED ORAL AT BEDTIME
Qty: 90 TABLET | Refills: 3 | Status: SHIPPED | OUTPATIENT
Start: 2023-08-02 | End: 2024-09-26

## 2023-08-02 ASSESSMENT — ENCOUNTER SYMPTOMS
PARESTHESIAS: 0
NERVOUS/ANXIOUS: 0
DIZZINESS: 0
COUGH: 0
SORE THROAT: 0
HEMATURIA: 0
EYE PAIN: 0
SHORTNESS OF BREATH: 0
DIARRHEA: 0
NAUSEA: 0
MYALGIAS: 0
HEARTBURN: 0
DYSURIA: 0
ABDOMINAL PAIN: 0
FREQUENCY: 0
ARTHRALGIAS: 0
FEVER: 0
WEAKNESS: 0
HEADACHES: 0
JOINT SWELLING: 0
HEMATOCHEZIA: 0
CONSTIPATION: 0
CHILLS: 0
PALPITATIONS: 0

## 2023-08-02 ASSESSMENT — PAIN SCALES - GENERAL: PAINLEVEL: NO PAIN (0)

## 2023-08-03 ENCOUNTER — TELEPHONE (OUTPATIENT)
Dept: GASTROENTEROLOGY | Facility: CLINIC | Age: 53
End: 2023-08-03
Payer: COMMERCIAL

## 2023-08-03 ENCOUNTER — HOSPITAL ENCOUNTER (OUTPATIENT)
Facility: AMBULATORY SURGERY CENTER | Age: 53
End: 2023-08-03
Attending: SURGERY
Payer: COMMERCIAL

## 2023-08-03 DIAGNOSIS — Z12.11 SPECIAL SCREENING FOR MALIGNANT NEOPLASMS, COLON: Primary | ICD-10-CM

## 2023-08-03 NOTE — TELEPHONE ENCOUNTER
"Endoscopy Scheduling Screen    Have you had a positive Covid test in the last 14 days?  No    Are you active on MyChart?   Yes    What insurance is in the chart?  Other:  [employer]/HP     Ordering/Referring Provider: OLAMIDE HOLGUIN    (If ordering provider performs procedure, schedule with ordering provider unless otherwise instructed. )    BMI: Estimated body mass index is 32.82 kg/m  as calculated from the following:    Height as of 7/28/22: 1.77 m (5' 9.69\").    Weight as of 8/2/23: 102.8 kg (226 lb 11.2 oz).     Sedation Ordered  moderate sedation.   If patient BMI > 50 do not schedule in ASC.    Are you taking any prescription medications for pain?   No    Are you taking methadone or Suboxone?  No    Do you have a history of malignant hyperthermia or adverse reaction to anesthesia?  No    (Females) Are you currently pregnant?   No     Have you been diagnosed or told you have pulmonary hypertension?   No    Do you have an LVAD?  No    Have you been told you have moderate to severe sleep apnea?  No    Have you been told you have COPD, asthma, or any other lung disease?  No    Do you have any heart conditions?  Yes     In the past 6 months, have you had any hospitalizations for heart related issues including cardiomyopathy, heart attack, or stent placement?  No    Do you have any implantable devices in your body (pacemaker, ICD)?  No    Do you take nitroglycerine?  No    Have you ever had or are you awaiting a heart or lung transplant?   No    Have you had a stroke or transient ischemic attack (TIA aka \"mini stroke\" in the last 6 months?   No    Have you been diagnosed with or been told you have cirrhosis of the liver?   No    Are you currently on dialysis?   No    Do you need assistance transferring?   No    BMI: Estimated body mass index is 32.82 kg/m  as calculated from the following:    Height as of 7/28/22: 1.77 m (5' 9.69\").    Weight as of 8/2/23: 102.8 kg (226 lb 11.2 oz).     Is patients BMI > 40 and " scheduling location [facility]?  No    Do you take the medication Phentermine, Ozempic or Wegovy?  No    Do you take the medication Naltrexone?  No    Do you take blood thinners?  No      Prep   Are you currently on dialysis or do you have chronic kidney disease?  No    Do you have a diagnosis of diabetes?  Yes (Golytely Prep)    Do you have a diagnosis of cystic fibrosis (CF)?  No    On a regular basis do you go 3 -5 days between bowel movements?  No    BMI > 40?  No    Preferred Pharmacy:    [facility]/pharmacy #[id number] - [place], [place] - [address] [place] [address] [id number] [address]  [place] [address]  Phone: [phone] Fax: [phone]      Final Scheduling Details   Colonoscopy prep sent?  Standard Golytely    Procedure scheduled  Colonoscopy    Surgeon:  ZELDA      Date of procedure:  12/11/2023     Schedule PAC:   No    [facility]    Sedation   MAC/Deep Sedation    Patient Reminders:   You will receive a call from a Nurse to review instructions and health history.  This assessment must be completed prior to your procedure.  Failure to complete the Nurse assessment may result in the procedure being cancelled.      On the day of your procedure, please designate an adult(s) who can drive you home stay with you for the next 24 hours. The medicines used in the exam will make you sleepy. You will not be able to drive.      You cannot take public transportation, ride share services, or non-medical taxi service without a responsible caregiver.  Medical transport services are allowed with the requirement that a responsible caregiver will receive you at your destination.  We require that drivers and caregivers are confirmed prior to your procedure.

## 2023-08-27 DIAGNOSIS — E11.9 TYPE 2 DIABETES MELLITUS WITHOUT COMPLICATION, WITHOUT LONG-TERM CURRENT USE OF INSULIN (H): Primary | ICD-10-CM

## 2023-08-27 NOTE — TELEPHONE ENCOUNTER
FYI - Status Update    Who is Calling: patient    Update: Pt needs refill for Metformin 2000 mg. Pt said they were notified when they tried to refill prescription that they cannot get it until 10/20. Pt is going to be out of prescription on 8/28.     Does caller want a call/response back: Yes     Could we send this information to you in [employer] or would you prefer to receive a phone call?:   Patient would prefer a phone call   Okay to leave a detailed message?: Yes at Cell number on file:    Telephone Information:   Mobile [phone]

## 2023-08-28 RX ORDER — METFORMIN HCL 500 MG
1000 TABLET, EXTENDED RELEASE 24 HR ORAL 2 TIMES DAILY WITH MEALS
Qty: 90 TABLET | Refills: 3 | Status: SHIPPED | OUTPATIENT
Start: 2023-08-28 | End: 2023-12-19

## 2023-10-27 ENCOUNTER — TELEPHONE (OUTPATIENT)
Dept: INTERNAL MEDICINE | Facility: CLINIC | Age: 53
End: 2023-10-27
Payer: COMMERCIAL

## 2023-10-27 DIAGNOSIS — F41.9 ANXIETY: Primary | ICD-10-CM

## 2023-10-27 RX ORDER — HYDROXYZINE PAMOATE 25 MG/1
25 CAPSULE ORAL 3 TIMES DAILY PRN
Qty: 30 CAPSULE | Refills: 3 | Status: SHIPPED | OUTPATIENT
Start: 2023-10-27 | End: 2024-09-26

## 2023-10-27 NOTE — TELEPHONE ENCOUNTER
New Medication Request    Contacts         Type Contact Phone/Fax    10/27/2023 09:40 AM CDT Phone (Incoming) Inocencio Macias (Self) [phone] (H)            What medication are you requesting?: Something to help with Anxiety attacks    Was at [facility] yesterday and ended [facility] at [facility] thought [facility] was having another stroke.  It was not a stroke.  Requesting medication to help with anxiety attacks.    Reason for medication request: Anxiety     Have you taken this medication before?: Yes: taken medication in past for anxiety not sure of the name of medication.     Controlled Substance Agreement on file:   CSA -- Patient Level:    CSA: None found at the patient level.         Patient offered an appointment? Yes: scheduled virtual video 11/1/23    Preferred Pharmacy:    [facility] #[id number] - [place], [place] - [address] [place] [address] [address] OF [address] [address]  Phone: [phone] Fax: [phone]      Could we send this information to you in [place] or would you prefer to receive a phone call?:   Patient would like to be contacted via [employer] or phone call what ever is easiest for Care Team.

## 2023-10-27 NOTE — TELEPHONE ENCOUNTER
Radha Reynolds, NP  [facility]5 minutes ago (12:50 PM)     SL  Hydroxyzine sent to pharmacy.  Please also schedule patient for a phone visit next week have him complete a PHQ-9 and a ALYCE-7 will recommend long-term medication to be taken on a daily basis in addition to hydroxyzine as needed.

## 2023-10-31 ASSESSMENT — ANXIETY QUESTIONNAIRES
6. BECOMING EASILY ANNOYED OR IRRITABLE: SEVERAL DAYS
5. BEING SO RESTLESS THAT IT IS HARD TO SIT STILL: NOT AT ALL
7. FEELING AFRAID AS IF SOMETHING AWFUL MIGHT HAPPEN: SEVERAL DAYS
4. TROUBLE RELAXING: SEVERAL DAYS
3. WORRYING TOO MUCH ABOUT DIFFERENT THINGS: SEVERAL DAYS
IF YOU CHECKED OFF ANY PROBLEMS ON THIS QUESTIONNAIRE, HOW DIFFICULT HAVE THESE PROBLEMS MADE IT FOR YOU TO DO YOUR WORK, TAKE CARE OF THINGS AT HOME, OR GET ALONG WITH OTHER PEOPLE: SOMEWHAT DIFFICULT
2. NOT BEING ABLE TO STOP OR CONTROL WORRYING: SEVERAL DAYS
1. FEELING NERVOUS, ANXIOUS, OR ON EDGE: SEVERAL DAYS
GAD7 TOTAL SCORE: 6

## 2023-10-31 ASSESSMENT — PATIENT HEALTH QUESTIONNAIRE - PHQ9: SUM OF ALL RESPONSES TO PHQ QUESTIONS 1-9: 6

## 2023-10-31 NOTE — PROGRESS NOTES
"Inocencio is a 53 year old who is being evaluated via a billable video visit.      How would you like to obtain your AVS? MyChart  If the video visit is dropped, the invitation should be resent by: Text to cell phone: [phone]  Will anyone else be joining your video visit? No          Assessment & Plan   Problem List Items Addressed This Visit          Endocrine    Diabetes mellitus, type 2 (H)     Other Visit Diagnoses       Hospital discharge follow-up    -  Primary    Coronary artery disease involving native heart without angina pectoris, unspecified vessel or lesion type        Carotid stenosis, symptomatic w/o infarct, right        Former smoker        History of stroke        History of heart attack        Hyperlipidemia LDL goal <100        Anxiety        Relevant Medications    sertraline (ZOLOFT) 50 MG tablet    DIMAS (obstructive sleep apnea)        Relevant Orders    Adult Sleep Eval & Management  Referral    Alcohol use        Relevant Orders    Adult Mental Health  Referral           Patient states last Thursday he was bending over at work and found to have an \"oval\" in his right eye that did not go away and subsequently went to the eye doctor than then went to [facility] and underwent right carotid endarterectomy as had 90% atherosclerotic plaque.      Hasn't drank in 3 weeks and is unsure if he needs meetings, treatment or possible medications to assist with cravings.  Patient indicates he would like to consider his options and will discuss  at upcoming clinic appointment.    Patient was started on Zoloft 50 mg daily for anxiety, with recommendation to recheck within the next 4 weeks.  Mom is       MEDICATIONS:   Orders Placed This Encounter   Medications    sertraline (ZOLOFT) 50 MG tablet     Sig: Take 1 tablet (50 mg) by mouth daily     Dispense:  30 tablet     Refill:  3     There are no discontinued medications.       - Continue other medications without " change  CONSULTATION/REFERRAL to sleep medicine and mental health provider  FUTURE APPOINTMENTS:       - Follow-up visit in 1 week     Radha Reynolds NP  [facility] BRENT Painter is a 53 year old, presenting for the following health issues:  No chief complaint on file.      History of Present Illness       Mental Health Follow-up:  Patient presents to follow-up on Depression & Anxiety.Patient's depression since last visit has been:  Medium  The patient is not having other symptoms associated with depression.  Patient's anxiety since last visit has been:  Worse  The patient is having other symptoms associated with anxiety.  Any significant life events: health concerns  Patient is feeling anxious or having panic attacks.  Patient has concerns about alcohol or drug use.    Reason for visit:  Anxiety and another roto rotor  right side this time.  Symptom onset:  3-7 days ago  Symptoms include:  Smudge in my right eye  Symptom intensity:  Mild  Symptom progression:  Staying the same  Had these symptoms before:  Yes  Has tried/received treatment for these symptoms:  Yes  Previous treatment was successful:  Yes  Prior treatment description:  Cleaned out my right artery    He eats 0-1 servings of fruits and vegetables daily.He consumes 0 sweetened beverage(s) daily.He exercises with enough effort to increase his heart rate 20 to 29 minutes per day.  He exercises with enough effort to increase his heart rate 5 days per week.   He is taking medications regularly.       Social History     Tobacco Use    Smoking status: Former     Packs/day: 0.00     Years: 20.00     Additional pack years: 0.00     Total pack years: 0.00     Types: Cigarettes     Quit date: 2009     Years since quittin.8    Smokeless tobacco: Never   Substance Use Topics    Alcohol use: Yes     Alcohol/week: 3.0 standard drinks of alcohol    Drug use: Not Currently     Types: Marijuana         10/29/2021     7:25 AM  7/28/2022     8:49 AM 10/31/2023     4:53 PM   ALYCE-7 SCORE   Total Score 0 (minimal anxiety) 0 (minimal anxiety) 6 (mild anxiety)   Total Score 0 0 6         7/28/2022     8:48 AM 10/31/2023     4:52 PM 11/6/2023    11:03 AM   PHQ   PHQ-9 Total Score 0 6 6   Q9: Thoughts of better off dead/self-harm past 2 weeks Not at all Not at all Not at all         11/6/2023    11:03 AM   Last PHQ-9   1.  Little interest or pleasure in doing things 1   2.  Feeling down, depressed, or hopeless 1   3.  Trouble falling or staying asleep, or sleeping too much 1   4.  Feeling tired or having little energy 1   5.  Poor appetite or overeating 1   6.  Feeling bad about yourself 1   7.  Trouble concentrating 0   8.  Moving slowly or restless 0   Q9: Thoughts of better off dead/self-harm past 2 weeks 0   PHQ-9 Total Score 6         10/31/2023     4:53 PM   ALYCE-7    1. Feeling nervous, anxious, or on edge 1   2. Not being able to stop or control worrying 1   3. Worrying too much about different things 1   4. Trouble relaxing 1   5. Being so restless that it is hard to sit still 0   6. Becoming easily annoyed or irritable 1   7. Feeling afraid, as if something awful might happen 1   ALYCE-7 Total Score 6   If you checked any problems, how difficult have they made it for you to do your work, take care of things at home, or get along with other people? Somewhat difficult       Hospital Follow-up Visit:    Hospital/Nursing Home/IP Rehab Facility:  [facility]   Date of Admission: 10.27.23  Date of Discharge: 10.31.23  Reason(s) for Admission: Acute ischemic stroke with right retinal artery occlusion, severe critical right ICA stenosis    Was your hospitalization related to COVID-19? No   Problems taking medications regularly:  None  Medication changes since discharge: None  Problems adhering to non-medication therapy:  None    Summary of hospitalization:  CareEverywhere information obtained and reviewed  Diagnostic Tests/Treatments reviewed.  Follow  up needed: labs, mental health referral, cardiology, neurology, sleep medicine   Other Healthcare Providers Involved in Patient s Care:         Specialist appointment - Sleep Medicine, Mental Health Provider, Cardiology, Neurology   Update since discharge: improved.         Plan of care communicated with patient                 Review of Systems   Constitutional, HEENT, cardiovascular, pulmonary, GI, , musculoskeletal, neuro, skin, endocrine and psych systems are negative, except as otherwise noted.      Objective           Vitals:  No vitals were obtained today due to virtual visit.    Physical Exam   GENERAL: Healthy, alert and no distress  EYES: Eyes grossly normal to inspection.  No discharge or erythema, or obvious scleral/conjunctival abnormalities.  RESP: No audible wheeze, cough, or visible cyanosis.  No visible retractions or increased work of breathing.    SKIN: Visible skin clear. No significant rash, abnormal pigmentation or lesions.  NEURO: Cranial nerves grossly intact.  Mentation and speech appropriate for age.  PSYCH: Mentation appears normal, affect normal/bright, judgement and insight intact, normal speech and appearance well-groomed.    Office Visit on 08/02/2023   Component Date Value Ref Range Status    Hemoglobin A1C 08/02/2023 8.3 (H)  0.0 - 5.6 % Final    Creatinine Urine mg/dL 08/02/2023 165.0  mg/dL Final    The reference ranges have not been established in urine creatinine. The results should be integrated into the clinical context for interpretation.    Albumin Urine mg/L 08/02/2023 15.8  mg/L Final    The reference ranges have not been established in urine albumin. The results should be integrated into the clinical context for interpretation.    Albumin Urine mg/g Cr 08/02/2023 9.58  0.00 - 17.00 mg/g Cr Final    Microalbuminuria is defined as an albumin:creatinine ratio of 17 to 299 for males and 25 to 299 for females. A ratio of albumin:creatinine of 300 or higher is indicative of  overt proteinuria.  Due to biologic variability, positive results should be confirmed by a second, first-morning random or 24-hour timed urine specimen. If there is discrepancy, a third specimen is recommended. When 2 out of 3 results are in the microalbuminuria range, this is evidence for incipient nephropathy and warrants increased efforts at glucose control, blood pressure control, and institution of therapy with an angiotensin-converting-enzyme (ACE) inhibitor (if the patient can tolerate it).      Cholesterol 08/02/2023 160  <200 mg/dL Final    Triglycerides 08/02/2023 158 (H)  <150 mg/dL Final    Direct Measure HDL 08/02/2023 38 (L)  >=40 mg/dL Final    LDL Cholesterol Calculated 08/02/2023 90  <=100 mg/dL Final    Non HDL Cholesterol 08/02/2023 122  <130 mg/dL Final    Sodium 08/02/2023 137  136 - 145 mmol/L Final    Potassium 08/02/2023 4.1  3.4 - 5.3 mmol/L Final    Chloride 08/02/2023 101  98 - 107 mmol/L Final    Carbon Dioxide (CO2) 08/02/2023 24  22 - 29 mmol/L Final    Anion Gap 08/02/2023 12  7 - 15 mmol/L Final    Urea Nitrogen 08/02/2023 15.5  6.0 - 20.0 mg/dL Final    Creatinine 08/02/2023 0.82  0.67 - 1.17 mg/dL Final    Calcium 08/02/2023 9.7  8.6 - 10.0 mg/dL Final    Glucose 08/02/2023 199 (H)  70 - 99 mg/dL Final    Alkaline Phosphatase 08/02/2023 116  40 - 129 U/L Final    AST 08/02/2023 29  0 - 45 U/L Final    Reference intervals for this test were updated on 6/12/2023 to more accurately reflect our healthy population. There may be differences in the flagging of prior results with similar values performed with this method. Interpretation of those prior results can be made in the context of the updated reference intervals.    ALT 08/02/2023 53  0 - 70 U/L Final    Reference intervals for this test were updated on 6/12/2023 to more accurately reflect our healthy population. There may be differences in the flagging of prior results with similar values performed with this method. Interpretation  of those prior results can be made in the context of the updated reference intervals.      Protein Total 08/02/2023 7.5  6.4 - 8.3 g/dL Final    Albumin 08/02/2023 4.8  3.5 - 5.2 g/dL Final    Bilirubin Total 08/02/2023 0.5  <=1.2 mg/dL Final    GFR Estimate 08/02/2023 >90  >60 mL/min/1.73m2 Final    Prostate Specific Antigen Screen 08/02/2023 0.43  0.00 - 3.50 ng/mL Final    WBC Count 08/02/2023 6.6  4.0 - 11.0 10e3/uL Final    RBC Count 08/02/2023 5.26  4.40 - 5.90 10e6/uL Final    Hemoglobin 08/02/2023 15.9  13.3 - 17.7 g/dL Final    Hematocrit 08/02/2023 45.2  40.0 - 53.0 % Final    MCV 08/02/2023 86  78 - 100 fL Final    MCH 08/02/2023 30.2  26.5 - 33.0 pg Final    MCHC 08/02/2023 35.2  31.5 - 36.5 g/dL Final    RDW 08/02/2023 12.1  10.0 - 15.0 % Final    Platelet Count 08/02/2023 237  150 - 450 10e3/uL Final    % Neutrophils 08/02/2023 68  % Final    % Lymphocytes 08/02/2023 19  % Final    % Monocytes 08/02/2023 7  % Final    % Eosinophils 08/02/2023 5  % Final    % Basophils 08/02/2023 1  % Final    % Immature Granulocytes 08/02/2023 0  % Final    Absolute Neutrophils 08/02/2023 4.5  1.6 - 8.3 10e3/uL Final    Absolute Lymphocytes 08/02/2023 1.3  0.8 - 5.3 10e3/uL Final    Absolute Monocytes 08/02/2023 0.5  0.0 - 1.3 10e3/uL Final    Absolute Eosinophils 08/02/2023 0.3  0.0 - 0.7 10e3/uL Final    Absolute Basophils 08/02/2023 0.0  0.0 - 0.2 10e3/uL Final    Absolute Immature Granulocytes 08/02/2023 0.0  <=0.4 10e3/uL Final               Video-Visit Details    Type of service:  Video Visit   Start: 7:20am   End: 7:50am   Originating Location (pt. Location): Home    Distant Location (provider location):  On-site  Platform used for Video Visit: Bryn    I spent a total of 28 minutes on the day of the visit.   Time spent by me doing chart review, history and exam, documentation and further activities per the note

## 2023-11-01 ENCOUNTER — VIRTUAL VISIT (OUTPATIENT)
Dept: INTERNAL MEDICINE | Facility: CLINIC | Age: 53
End: 2023-11-01
Payer: COMMERCIAL

## 2023-11-01 DIAGNOSIS — E78.5 HYPERLIPIDEMIA LDL GOAL <100: ICD-10-CM

## 2023-11-01 DIAGNOSIS — E11.9 TYPE 2 DIABETES MELLITUS WITHOUT COMPLICATION, WITHOUT LONG-TERM CURRENT USE OF INSULIN (H): ICD-10-CM

## 2023-11-01 DIAGNOSIS — I65.21 CAROTID STENOSIS, SYMPTOMATIC W/O INFARCT, RIGHT: ICD-10-CM

## 2023-11-01 DIAGNOSIS — Z78.9 ALCOHOL USE: ICD-10-CM

## 2023-11-01 DIAGNOSIS — G47.33 OSA (OBSTRUCTIVE SLEEP APNEA): ICD-10-CM

## 2023-11-01 DIAGNOSIS — I25.10 CORONARY ARTERY DISEASE INVOLVING NATIVE HEART WITHOUT ANGINA PECTORIS, UNSPECIFIED VESSEL OR LESION TYPE: ICD-10-CM

## 2023-11-01 DIAGNOSIS — F41.9 ANXIETY: ICD-10-CM

## 2023-11-01 DIAGNOSIS — Z87.891 FORMER SMOKER: ICD-10-CM

## 2023-11-01 DIAGNOSIS — Z86.73 HISTORY OF STROKE: ICD-10-CM

## 2023-11-01 DIAGNOSIS — I25.2 HISTORY OF HEART ATTACK: ICD-10-CM

## 2023-11-01 DIAGNOSIS — Z09 HOSPITAL DISCHARGE FOLLOW-UP: Primary | ICD-10-CM

## 2023-11-01 PROCEDURE — 99495 TRANSJ CARE MGMT MOD F2F 14D: CPT | Mod: VID | Performed by: NURSE PRACTITIONER

## 2023-11-01 ASSESSMENT — PATIENT HEALTH QUESTIONNAIRE - PHQ9
SUM OF ALL RESPONSES TO PHQ QUESTIONS 1-9: 6
10. IF YOU CHECKED OFF ANY PROBLEMS, HOW DIFFICULT HAVE THESE PROBLEMS MADE IT FOR YOU TO DO YOUR WORK, TAKE CARE OF THINGS AT HOME, OR GET ALONG WITH OTHER PEOPLE: SOMEWHAT DIFFICULT

## 2023-11-01 ASSESSMENT — ANXIETY QUESTIONNAIRES: GAD7 TOTAL SCORE: 6

## 2023-11-02 ENCOUNTER — TELEPHONE (OUTPATIENT)
Dept: INTERNAL MEDICINE | Facility: CLINIC | Age: 53
End: 2023-11-02
Payer: COMMERCIAL

## 2023-11-02 NOTE — TELEPHONE ENCOUNTER
Received forms from [employer]. Larry Fowler is pts . Requesting dx list and med list.       Faxed lists with forms. Placed copy in scanning.     Rodrigo Howe Jr., CMA on 11/2/2023 at 10:05 AM

## 2023-11-06 ENCOUNTER — VIRTUAL VISIT (OUTPATIENT)
Dept: ADDICTION MEDICINE | Facility: CLINIC | Age: 53
End: 2023-11-06
Attending: NURSE PRACTITIONER
Payer: COMMERCIAL

## 2023-11-06 DIAGNOSIS — F10.20 ALCOHOL USE DISORDER, MODERATE, DEPENDENCE (H): Primary | ICD-10-CM

## 2023-11-06 DIAGNOSIS — G47.00 INSOMNIA, UNSPECIFIED TYPE: ICD-10-CM

## 2023-11-06 DIAGNOSIS — Z71.6 ENCOUNTER FOR SMOKING CESSATION COUNSELING: ICD-10-CM

## 2023-11-06 DIAGNOSIS — Z78.9 ALCOHOL USE: ICD-10-CM

## 2023-11-06 PROCEDURE — 99204 OFFICE O/P NEW MOD 45 MIN: CPT | Mod: VID

## 2023-11-06 RX ORDER — NALTREXONE HYDROCHLORIDE 50 MG/1
50 TABLET, FILM COATED ORAL DAILY
Qty: 30 TABLET | Refills: 1 | Status: SHIPPED | OUTPATIENT
Start: 2023-11-13 | End: 2024-09-26

## 2023-11-06 ASSESSMENT — PATIENT HEALTH QUESTIONNAIRE - PHQ9
SUM OF ALL RESPONSES TO PHQ QUESTIONS 1-9: 6
10. IF YOU CHECKED OFF ANY PROBLEMS, HOW DIFFICULT HAVE THESE PROBLEMS MADE IT FOR YOU TO DO YOUR WORK, TAKE CARE OF THINGS AT HOME, OR GET ALONG WITH OTHER PEOPLE: NOT DIFFICULT AT ALL
SUM OF ALL RESPONSES TO PHQ QUESTIONS 1-9: 6

## 2023-11-06 NOTE — NURSING NOTE
Is the patient currently in the state of [place]? YES    Visit mode:VIDEO    If the visit is dropped, the patient can be reconnected by: VIDEO VISIT:  Either Text to cell phone:   Telephone Information:   Mobile [phone]    and VIDEO VISIT: Send to e-mail at: [email or web address]    Will anyone else be joining the visit? NO  (If patient encounters technical issues they should call [phone])    How would you like to obtain your AVS? MyChart    Are changes needed to the allergy or medication list? No  Patient denies any changes since echeck-in regarding medication and allergies and states all information entered during echeck-in remains accurate.     Reason for visit: Consult    Manda STEPHEN

## 2023-11-06 NOTE — PATIENT INSTRUCTIONS
Books:   Quit Like a Woman by Ros Wall     Finding Your Best Self by Cara Deutsch, PhD    Unexpected pedrito of being  sober by Maribel Espinoza    Blackout: Remembering the Things I drank to Forget by Vangie Chinchilla    Sober On A Drunk Planet (series of 3) by Lex Cunningham    Spirit Junkie by Leida Shook    A Banks Voice by Maddy Doan     Addictive Thinking, Understanding Self Deception by Nate King        Website  [employer]      Films:   Clean Slate   The Year of the Dog   The Recovery Boys   Four Good Days   Drugstore Cowboy   When a Man Loves a Woman   Flight   The Anonymous People   Anup Brand - Addiction to Recovery   Christiano and the Monster    Clean and Sober         Podcasts:    The Bubble Hour  https://www.Vint/bubblehour    Busy Living Sober  https://10sec.paraBebes.com/    A Sober Girls Guide https://www.[employer].paraBebes.com/    Recovery Happy Hour  https://www.Etacts.paraBebes.com/    The Addicted Mind https://Fangxinmei/    ODAAT Chat Podcast  https://Iterate Studio/    Breaking Free: Your Recovery, Your Way https://pod.link/0537049481

## 2023-11-06 NOTE — PROGRESS NOTES
"Virtual Visit Details    Type of service:  Video Visit   Joined the call at 11/6/2023, 2:58:52 pm.  Left the call at 11/6/2023, 4:02:16 pm.    Originating Location (pt. Location): Home    Distant Location (provider location):  Off-site  Platform used for Video Visit: Preact      Answers submitted by the patient for this visit:  Patient Health Questionnaire (Submitted on 11/6/2023)  If you checked off any problems, how difficult have these problems made it for you to do your work, take care of things at home, or get along with other people?: Not difficult at all  PHQ9 TOTAL SCORE: 6    .    SUBJECTIVE                                                      David Macias is a 53 year old male who presents for  initial visit for addiction consultation and management referred by Radha Reynolds NP  due to concerns for Alcohol Use Disorder (AUD)    Visit performed Virtual, via video    HPI:   David Macias is a 53 year old male with history of alcohol use disorder use who presents for further evaluation of possible substance use disorder and management options.    Inocencio presents today to discuss psychotherapy and pharmacotherapy options for alcohol use. Inocencio has a medical history of coronary artery stenosis, s/p endarterectomy, DIMAS, T2DM, R ocular stroke,  anxiety who recently was admitted to hospital for concern of an acute ischemic CVA. Inocencio was found to have a critical right ICA stenosis and had a endarterectomy completed.     Regarding history of alcohol use. Inocencio was evaluated by addiction medicine during hospital admission and offered medications which he declined at the time. Inocencio reports having a seven month period of sobriety but returned to drinking.  He is motivated to stop drinking at this time due to his health issus and drinking alcohol leads to Inocencio smoking cigarettes \"I have to do this, I can't rely on other people anymore\"      Inocencio's reports his  last use of alcohol Last drink 16 days ago.  Using I am sober TERESE.  " "Prior to his hospitalization he states he was drinking wine or beer.     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 He reports that playing in the band has contributed to his drinking. He reports that his drinking is worse with camping and to help him sleep.         Substance Use History:   \"Have you ever had any history with [...] use?\" And \"When was your last use?  ALCOHOL - 10/26  CANNABIS - Delta 8   PRESCRIPTION STIMULANTS (includes Ritalin, Adderall, Vyvanse) - experiment  COCAINE/CRACK - experiment  METH/AMPHETAMINES (includes ecstacy, MDMA/tisha) - experimental  OPIATES - denies  BENZODIAZEPINES (includes Ativan, Klonopin, Xanax) - denies  KRATOM (mild opioid and stimulant effects) - denies  KETAMINE - denies  HALLUCINOGENS (includes DXM) - denies  BEHAVIORAL (Gambling, Eating d/o, Compulsivity) - overweight.   History of treatment -     NICOTINE  Cigarettes: 11 days ago.   Chew/snus: no, histo  Vaping: no vaping  Past NRT/medication use: gum,       Previous withdrawal treatment episodes (e.g. detox):   Previous PEDRO treatment programs: 16 yo for THC, required from  Hospitalizations or overdose: no  Medical complications from substance use: denies  IV Drug use?: denies  Previous Medication for Addiction Tx: denies  Longest period of full abstinence: 7 months   Activities that have previously supported abstinence: medical complications  Current Recovery Activities: I am sober chirag      Infectious disease screening  Hep C:    Hepatitis C Antibody   Date Value Ref Range Status   07/28/2022 Nonreactive Nonreactive Final       HIV:    HIV Antigen Antibody Combo   Date Value Ref Range Status   07/28/2022 Nonreactive Nonreactive Final     Comment:     HIV-1 p24 Ag & HIV-1/HIV-2 Ab Not Detected             Psychiatric History (per patient report and problem list review)  Past diagnoses - None  Current or past psychiatrist: None  Current or past therapist:  None  Hospitalizations/TMS/ECT - none  Suicide Attempts - None  Medication " trials - none      PHQ-9 scores:      7/28/2022     8:48 AM 10/31/2023     4:52 PM 11/6/2023    11:03 AM   PHQ   PHQ-9 Total Score 0 6 6   Q9: Thoughts of better off dead/self-harm past 2 weeks Not at all Not at all Not at all     ALYCE-7 scores:      10/29/2021     7:25 AM 7/28/2022     8:49 AM 10/31/2023     4:53 PM   ALYCE-7 SCORE   Total Score 0 (minimal anxiety) 0 (minimal anxiety) 6 (mild anxiety)   Total Score 0 0 6         SOCIAL HISTORY:  Housing status: with wife  Employment status: Employed full time work on a shipping dock.  Material.   Relationship status:   Children: step kids  Legal concerns related to use: none  Contact information up to date?     3rd Party Involvement  (please obtain RANCHO if pt would like to include)      Medical History:    Patient Active Problem List    Diagnosis Date Noted    Diabetes mellitus, type 2 (H) 10/29/2021     Priority: Medium       Past Medical History:   Diagnosis Date    History of MI (myocardial infarction)     Stroke (H)        Past Surgical History:   Procedure Laterality Date    CAROTID ENDARTERECTOMY Left     MOUTH SURGERY           Family History   Problem Relation Age of Onset    Heart Disease Mother     Hyperlipidemia Mother     Hypertension Mother     Diabetes Mother     Coronary Artery Disease Father     Heart Disease Father     Diabetes Brother     No Known Problems Brother          Current Outpatient Medications   Medication Sig Dispense Refill    aspirin 81 mg chewable tablet [ASPIRIN 81 MG CHEWABLE TABLET] Chew 81 mg.      atorvastatin (LIPITOR) 40 MG tablet Take 1 tablet (40 mg) by mouth At Bedtime 90 tablet 3    clopidogrel (PLAVIX) 75 MG tablet Take 1 tablet (75 mg) by mouth daily 90 tablet 3    glucosamine-chondroit-vit C-Mn 500-400 mg cap [GLUCOSAMINE-CHONDROIT-VIT C--400 MG CAP] Take 1 tablet by mouth daily.      hydrOXYzine (VISTARIL) 25 MG capsule Take 1 capsule (25 mg) by mouth 3 times daily as needed for itching 30 capsule 3     metFORMIN (GLUCOPHAGE XR) 500 MG 24 hr tablet Take 2 tablets (1,000 mg) by mouth 2 times daily (with meals) 90 tablet 3    sertraline (ZOLOFT) 50 MG tablet Take 1 tablet (50 mg) by mouth daily 30 tablet 3         No Known Allergies        OBJECTIVE                                                      EXAM    There were no vitals taken for this visit.          LAB          Hepatic Function  AST   Date Value Ref Range Status   08/02/2023 29 0 - 45 U/L Final     Comment:     Reference intervals for this test were updated on 6/12/2023 to more accurately reflect our healthy population. There may be differences in the flagging of prior results with similar values performed with this method. Interpretation of those prior results can be made in the context of the updated reference intervals.     ALT   Date Value Ref Range Status   08/02/2023 53 0 - 70 U/L Final     Comment:     Reference intervals for this test were updated on 6/12/2023 to more accurately reflect our healthy population. There may be differences in the flagging of prior results with similar values performed with this method. Interpretation of those prior results can be made in the context of the updated reference intervals.       Bilirubin Total   Date Value Ref Range Status   08/02/2023 0.5 <=1.2 mg/dL Final     Albumin   Date Value Ref Range Status   08/02/2023 4.8 3.5 - 5.2 g/dL Final   10/29/2021 4.1 3.5 - 5.0 g/dL Final       CBC  WBC Count   Date Value Ref Range Status   08/02/2023 6.6 4.0 - 11.0 10e3/uL Final     RBC Count   Date Value Ref Range Status   08/02/2023 5.26 4.40 - 5.90 10e6/uL Final     Hemoglobin   Date Value Ref Range Status   08/02/2023 15.9 13.3 - 17.7 g/dL Final     Hematocrit   Date Value Ref Range Status   08/02/2023 45.2 40.0 - 53.0 % Final     MCV   Date Value Ref Range Status   08/02/2023 86 78 - 100 fL Final     MCH   Date Value Ref Range Status   08/02/2023 30.2 26.5 - 33.0 pg Final     MCHC   Date Value Ref Range Status    08/02/2023 35.2 31.5 - 36.5 g/dL Final     Platelet Count   Date Value Ref Range Status   08/02/2023 237 150 - 450 10e3/uL Final     RDW   Date Value Ref Range Status   08/02/2023 12.1 10.0 - 15.0 % Final       Today's lab data  No results found for any visits on 11/06/23.        [facility] Board  Pharmacy Data Base Reviewed;    yes Consistent with patient reports and Epic records.           A/P                                                      ASSESSMENT/PLAN:  1. Alcohol use disorder, moderate, dependence (H)  2. Alcohol use  -Needs improvement  - Adult Mental Health  Referral  - Adult Mental Health  Referral; Future  - naltrexone (DEPADE/REVIA) 50 MG tablet; Take 1 tablet (50 mg) by mouth daily Take 1/2 tablet for 3-5 days to avoid side-effects (most common are nausea, headache) Do not start until 11/13/2023  Dispense: 30 tablet; Refill: 1  Had prescription oxycodone discontinued 11/4/2023  -Using I am sober chirag  -Counseled the patient on the importance of having a recovery program in addition to medication to manage recovery. -Components include avoiding isolating, having willingness to change, avoiding triggers and managing cravings. Encouraged having some type of sober network and practicing honesty with trusted support person(s).   -Encouraged other services such as counseling, 12 step or other self-help organizations.   -Referral placed to initiate psychosocial counseling   -follow up in 3 weeks    3. Insomnia, unspecified type  -Needs improvement  -Continue with alcohol cessation  -Sleep hygiene, avoid caffeine, maintain a consistent bed time    4 Encounter for smoking cessation counseling  -Needs improvement  -smoking exacerbated by drinking  -maintain strict abstinence  -briefly discussed buproprion  -continue to ask, assess and advise  -follow up in three weeks        ENCOUNTER FOR LONG TERM USE OF HIGH RISK MEDICATION   High Risk Drug Monitoring?  YES   Drug being monitored:  Naltrexone   Reason for drug: alcohol Use Disorder   What is being monitored?: Dosage, Cravings, Triggers and side effects       Counseled the patient on the importance of having a recovery program in addition to medication to manage recovery.  Components include avoiding isolating, having willingness to change, avoiding triggers and managing cravings. Encouraged having some type of sober network and practicing honesty with trusted support person(s). Encouraged other services such as counseling, 12 step or other self-help organizations.      RTC   3 weeks        Mariza Burrell NP  [facility]  [phone]

## 2023-11-07 ENCOUNTER — TELEPHONE (OUTPATIENT)
Dept: INTERNAL MEDICINE | Facility: CLINIC | Age: 53
End: 2023-11-07
Payer: COMMERCIAL

## 2023-11-07 NOTE — TELEPHONE ENCOUNTER
[employer] called and requested to speak to Radha regarding a Short Term Disability Claim for David. Gave no further information     [phone] Extension #[id number]

## 2023-11-08 ENCOUNTER — VIRTUAL VISIT (OUTPATIENT)
Dept: INTERNAL MEDICINE | Facility: CLINIC | Age: 53
End: 2023-11-08
Payer: COMMERCIAL

## 2023-11-08 ENCOUNTER — MYC MEDICAL ADVICE (OUTPATIENT)
Dept: INTERNAL MEDICINE | Facility: CLINIC | Age: 53
End: 2023-11-08

## 2023-11-08 DIAGNOSIS — I25.2 HISTORY OF HEART ATTACK: ICD-10-CM

## 2023-11-08 DIAGNOSIS — E11.9 TYPE 2 DIABETES MELLITUS WITHOUT COMPLICATION, WITHOUT LONG-TERM CURRENT USE OF INSULIN (H): ICD-10-CM

## 2023-11-08 DIAGNOSIS — E78.5 HYPERLIPIDEMIA LDL GOAL <100: ICD-10-CM

## 2023-11-08 DIAGNOSIS — I25.10 CORONARY ARTERY DISEASE INVOLVING NATIVE HEART WITHOUT ANGINA PECTORIS, UNSPECIFIED VESSEL OR LESION TYPE: Primary | ICD-10-CM

## 2023-11-08 DIAGNOSIS — Z86.73 HISTORY OF STROKE: ICD-10-CM

## 2023-11-08 DIAGNOSIS — Z87.891 FORMER SMOKER: ICD-10-CM

## 2023-11-08 DIAGNOSIS — F41.9 ANXIETY: ICD-10-CM

## 2023-11-08 DIAGNOSIS — G47.33 OSA (OBSTRUCTIVE SLEEP APNEA): ICD-10-CM

## 2023-11-08 DIAGNOSIS — Z78.9 ALCOHOL USE: ICD-10-CM

## 2023-11-08 PROCEDURE — 99443 PR PHYSICIAN TELEPHONE EVALUATION 21-30 MIN: CPT | Mod: 93 | Performed by: NURSE PRACTITIONER

## 2023-11-08 RX ORDER — SERTRALINE HYDROCHLORIDE 100 MG/1
100 TABLET, FILM COATED ORAL DAILY
Qty: 30 TABLET | Refills: 5 | Status: SHIPPED | OUTPATIENT
Start: 2023-11-08 | End: 2024-05-29

## 2023-11-08 RX ORDER — BLOOD-GLUCOSE SENSOR
1 EACH MISCELLANEOUS
Qty: 2 EACH | Refills: 5 | Status: SHIPPED | OUTPATIENT
Start: 2023-11-08 | End: 2024-09-26

## 2023-11-08 NOTE — LETTER
November 8, 2023      David Macias  [address] [place] [address]        To Whom It May Concern:    David Macias was seen in our clinic. He may return to work without restrictions.      Sincerely,        Radha Reynolds NP

## 2023-11-08 NOTE — PROGRESS NOTES
Inocencio is a 53 year old who is being evaluated via a billable telephone visit.      What phone number would you like to be contacted at? Home   How would you like to obtain your AVS? Armond    Distant Location (provider location):  Off-site    Assessment & Plan   Problem List Items Addressed This Visit        Endocrine    Diabetes mellitus, type 2 (H)    Relevant Medications    Continuous Blood Gluc  (FREESTYLE GWYN 2 READER) GERTRUDE    Continuous Blood Gluc Sensor (FREESTYLE GWYN 2 SENSOR) MISC    Continuous Blood Gluc Sensor (FREESTYLE GWYN 3 SENSOR) MISC   Other Visit Diagnoses     Coronary artery disease involving native heart without angina pectoris, unspecified vessel or lesion type    -  Primary    Former smoker        History of stroke        History of heart attack        Hyperlipidemia LDL goal <100        Anxiety        Relevant Medications    sertraline (ZOLOFT) 100 MG tablet    Other Relevant Orders    Adult Mental Health  Referral    DIMAS (obstructive sleep apnea)        Alcohol use           [employer] absent [employer], patient sent over forms that needs to complete    Patient reports sometimes the pills patient is taking may be causing more anxiety.  Patient states he is feeling anxious about completing paperwork and getting back to work in addition to some relationship stress with spouse.  Patient states he and his significant other have been distant and indicates he did not realize he hurt is significant others feelings and they are trying to work things out.  There is some frustration with significant other continuing to drink alcohol and smoke cigarettes.      Patient states prior to getting anxiety medications patient brought some marya though did not drink it.  Patient indicates he plans to remain sober and not smoke.      Recommend starting naltrexone. Recommend walking, meditation, nutrition.     Increase Zoloft to 100mg daily, prn vistaril, referral to counseling    Sleep study  upcoming.         MED REC REQUIRED  Post Medication Reconciliation Status:       MEDICATIONS:   Orders Placed This Encounter   Medications     Continuous Blood Gluc  (FREESTYLE GWYN 2 READER) GERTRUDE     Si each once for 1 dose Use to read blood sugars per 's instructions.     Dispense:  1 each     Refill:  0     Continuous Blood Gluc Sensor (FREESTYLE GWYN 2 SENSOR) MISC     Si each every 14 days Use 1 sensor every 14 days. Use to read blood sugars per 's instructions.     Dispense:  2 each     Refill:  5     Continuous Blood Gluc Sensor (FREESTYLE GWYN 3 SENSOR) MISC     Si each every 14 days Use 1 sensor every 14 days.     Dispense:  2 each     Refill:  5     sertraline (ZOLOFT) 100 MG tablet     Sig: Take 1 tablet (100 mg) by mouth daily     Dispense:  30 tablet     Refill:  5     For future refills     Medications Discontinued During This Encounter   Medication Reason     sertraline (ZOLOFT) 50 MG tablet Reorder (No AVS)          - Continue other medications without change    Radha Reynolds NP  [facility] 53 [facility], presenting for the following health issues:  No chief complaint on file.        2023     7:25 AM   Additional Questions   Roomed by Rodrigo SHAFFER CMA       HPI     Review of Systems   Constitutional, HEENT, cardiovascular, pulmonary, GI, , musculoskeletal, neuro, skin, endocrine and psych systems are negative, except as otherwise noted.      Objective           Vitals:  No vitals were obtained today due to virtual visit.    Physical Exam   healthy, alert and no distress  PSYCH: Alert and oriented times 3; coherent speech, normal   rate and volume, able to articulate logical thoughts, able   to abstract reason, no tangential thoughts, no hallucinations   or delusions  His affect is normal  RESP: No cough, no audible wheezing, able to talk in full sentences  Remainder of exam unable to be completed due to  telephone visits    Office Visit on 08/02/2023   Component Date Value Ref Range Status     Hemoglobin A1C 08/02/2023 8.3 (H)  0.0 - 5.6 % Final     Creatinine Urine mg/dL 08/02/2023 165.0  mg/dL Final    The reference ranges have not been established in urine creatinine. The results should be integrated into the clinical context for interpretation.     Albumin Urine mg/L 08/02/2023 15.8  mg/L Final    The reference ranges have not been established in urine albumin. The results should be integrated into the clinical context for interpretation.     Albumin Urine mg/g Cr 08/02/2023 9.58  0.00 - 17.00 mg/g Cr Final    Microalbuminuria is defined as an albumin:creatinine ratio of 17 to 299 for males and 25 to 299 for females. A ratio of albumin:creatinine of 300 or higher is indicative of overt proteinuria.  Due to biologic variability, positive results should be confirmed by a second, first-morning random or 24-hour timed urine specimen. If there is discrepancy, a third specimen is recommended. When 2 out of 3 results are in the microalbuminuria range, this is evidence for incipient nephropathy and warrants increased efforts at glucose control, blood pressure control, and institution of therapy with an angiotensin-converting-enzyme (ACE) inhibitor (if the patient can tolerate it).       Cholesterol 08/02/2023 160  <200 mg/dL Final     Triglycerides 08/02/2023 158 (H)  <150 mg/dL Final     Direct Measure HDL 08/02/2023 38 (L)  >=40 mg/dL Final     LDL Cholesterol Calculated 08/02/2023 90  <=100 mg/dL Final     Non HDL Cholesterol 08/02/2023 122  <130 mg/dL Final     Sodium 08/02/2023 137  136 - 145 mmol/L Final     Potassium 08/02/2023 4.1  3.4 - 5.3 mmol/L Final     Chloride 08/02/2023 101  98 - 107 mmol/L Final     Carbon Dioxide (CO2) 08/02/2023 24  22 - 29 mmol/L Final     Anion Gap 08/02/2023 12  7 - 15 mmol/L Final     Urea Nitrogen 08/02/2023 15.5  6.0 - 20.0 mg/dL Final     Creatinine 08/02/2023 0.82  0.67 - 1.17  mg/dL Final     Calcium 08/02/2023 9.7  8.6 - 10.0 mg/dL Final     Glucose 08/02/2023 199 (H)  70 - 99 mg/dL Final     Alkaline Phosphatase 08/02/2023 116  40 - 129 U/L Final     AST 08/02/2023 29  0 - 45 U/L Final    Reference intervals for this test were updated on 6/12/2023 to more accurately reflect our healthy population. There may be differences in the flagging of prior results with similar values performed with this method. Interpretation of those prior results can be made in the context of the updated reference intervals.     ALT 08/02/2023 53  0 - 70 U/L Final    Reference intervals for this test were updated on 6/12/2023 to more accurately reflect our healthy population. There may be differences in the flagging of prior results with similar values performed with this method. Interpretation of those prior results can be made in the context of the updated reference intervals.       Protein Total 08/02/2023 7.5  6.4 - 8.3 g/dL Final     Albumin 08/02/2023 4.8  3.5 - 5.2 g/dL Final     Bilirubin Total 08/02/2023 0.5  <=1.2 mg/dL Final     GFR Estimate 08/02/2023 >90  >60 mL/min/1.73m2 Final     Prostate Specific Antigen Screen 08/02/2023 0.43  0.00 - 3.50 ng/mL Final     WBC Count 08/02/2023 6.6  4.0 - 11.0 10e3/uL Final     RBC Count 08/02/2023 5.26  4.40 - 5.90 10e6/uL Final     Hemoglobin 08/02/2023 15.9  13.3 - 17.7 g/dL Final     Hematocrit 08/02/2023 45.2  40.0 - 53.0 % Final     MCV 08/02/2023 86  78 - 100 fL Final     MCH 08/02/2023 30.2  26.5 - 33.0 pg Final     MCHC 08/02/2023 35.2  31.5 - 36.5 g/dL Final     RDW 08/02/2023 12.1  10.0 - 15.0 % Final     Platelet Count 08/02/2023 237  150 - 450 10e3/uL Final     % Neutrophils 08/02/2023 68  % Final     % Lymphocytes 08/02/2023 19  % Final     % Monocytes 08/02/2023 7  % Final     % Eosinophils 08/02/2023 5  % Final     % Basophils 08/02/2023 1  % Final     % Immature Granulocytes 08/02/2023 0  % Final     Absolute Neutrophils 08/02/2023 4.5  1.6 - 8.3  10e3/uL Final     Absolute Lymphocytes 08/02/2023 1.3  0.8 - 5.3 10e3/uL Final     Absolute Monocytes 08/02/2023 0.5  0.0 - 1.3 10e3/uL Final     Absolute Eosinophils 08/02/2023 0.3  0.0 - 0.7 10e3/uL Final     Absolute Basophils 08/02/2023 0.0  0.0 - 0.2 10e3/uL Final     Absolute Immature Granulocytes 08/02/2023 0.0  <=0.4 10e3/uL Final               Phone call duration: 40 minutes

## 2023-11-09 ENCOUNTER — TELEPHONE (OUTPATIENT)
Dept: INTERNAL MEDICINE | Facility: CLINIC | Age: 53
End: 2023-11-09

## 2023-11-09 NOTE — TELEPHONE ENCOUNTER
PRIOR AUTHORIZATION DENIED    Medication: FREESTYLE GWYN 3 SENSOR [facility] [place]  Insurance Company: [employer] - Phone [phone] Fax [phone]  Denial Date: 11/9/2023  Denial Rational:     Appeal Information:     Patient Notified: No

## 2023-11-09 NOTE — TELEPHONE ENCOUNTER
Sent Betyahg regarding this. Please see encounter.       Rodrigo Howe Jr., CMA on 11/9/2023 at 1:46 PM

## 2023-11-13 ENCOUNTER — TELEPHONE (OUTPATIENT)
Dept: INTERNAL MEDICINE | Facility: CLINIC | Age: 53
End: 2023-11-13
Payer: COMMERCIAL

## 2023-11-13 NOTE — TELEPHONE ENCOUNTER
Pt calling with some questions regarding medication. Pt started Sertraline and is wondering if that is causing him experiencing shakiness and trembling. Pt states he has cut out caffeine from diet. Latest BS readings were 106 from a couple hours ago. Could not get a current one as he was on the phone with writer.       Explained to pt that writer will send this off to the RN's who may call back and triage him with other questions.       Rodrigo Howe Jr., CMA on 11/13/2023 at 2:36 PM

## 2023-11-13 NOTE — TELEPHONE ENCOUNTER
Called pt to gain more information on call below. Pt states the trembling and shakiness he feels started around the time when PCP increased sertraline from 50 mg to 100 mg on 11/8. Pt states he also cut out caffeine and is working on his diet for about three weeks now. He noticed he started to have migraines after cutting out caffeine from his diet. Pt states he also notices some dry mouth also but states he has not been drinking fluids as much as he should. Pt denies any nausea, vomiting, diarrhea, abdominal pain/upset stomach, etc. Pt believes this could be related to the increase in his sertraline. Pt states his anxiety is the same, not worsening.     Writer informed pt his blood sugar reading is within normal limits and to continue monitoring his BS as he normally does.     Pt states he otherwise feels fine, no emergent concerns at this time. Pt states he will reach back out if his symptoms worsen or anything else changes. Pt would like message sent to PCP or covering provider with any recommendations they may have.     Writer informed would route to covering provider and see if they have any recommendations as PCP is out of the office.     Pt thanked for the call and had no further questions.

## 2023-11-13 NOTE — TELEPHONE ENCOUNTER
Form signed and faxed to [employer]. Copies made for scanning and hold bin.       Rodrigo Howe Jr., CMA on 11/13/2023 at 2:16 PM

## 2023-11-13 NOTE — TELEPHONE ENCOUNTER
Forms are on writers desk for completion. Waiting to discuss with PCP regarding RTW status on pt.       Rodrigo Howe Jr., CMA on 11/13/2023 at 6:46 AM

## 2023-11-14 NOTE — TELEPHONE ENCOUNTER
I recommend lowering the dose back to 50 mg daily and follow up with Radha on 11/22. May need to consider an alternative medication or could possibly do 75 mg instead.

## 2023-11-14 NOTE — TELEPHONE ENCOUNTER
Sent recommendation to pt via [employer] as he has a ongoing msg.       Closing encounter.       Rodrigo Howe Jr., CMA on 11/14/2023 at 9:12 AM

## 2023-11-15 NOTE — TELEPHONE ENCOUNTER
New forms have been completed and signed. Faxed to [employer].      Copies made for scanning and Hold Bin.       Rodrigo Howe Jr., [facility] on 11/15/2023 at 1:36 PM

## 2023-11-21 ASSESSMENT — ANXIETY QUESTIONNAIRES
GAD7 TOTAL SCORE: 4
4. TROUBLE RELAXING: NOT AT ALL
5. BEING SO RESTLESS THAT IT IS HARD TO SIT STILL: NOT AT ALL
6. BECOMING EASILY ANNOYED OR IRRITABLE: NOT AT ALL
3. WORRYING TOO MUCH ABOUT DIFFERENT THINGS: SEVERAL DAYS
1. FEELING NERVOUS, ANXIOUS, OR ON EDGE: SEVERAL DAYS
GAD7 TOTAL SCORE: 4
2. NOT BEING ABLE TO STOP OR CONTROL WORRYING: SEVERAL DAYS
7. FEELING AFRAID AS IF SOMETHING AWFUL MIGHT HAPPEN: SEVERAL DAYS

## 2023-11-21 ASSESSMENT — PATIENT HEALTH QUESTIONNAIRE - PHQ9
SUM OF ALL RESPONSES TO PHQ QUESTIONS 1-9: 2
10. IF YOU CHECKED OFF ANY PROBLEMS, HOW DIFFICULT HAVE THESE PROBLEMS MADE IT FOR YOU TO DO YOUR WORK, TAKE CARE OF THINGS AT HOME, OR GET ALONG WITH OTHER PEOPLE: NOT DIFFICULT AT ALL
SUM OF ALL RESPONSES TO PHQ QUESTIONS 1-9: 2

## 2023-11-22 ENCOUNTER — TELEPHONE (OUTPATIENT)
Dept: INTERNAL MEDICINE | Facility: CLINIC | Age: 53
End: 2023-11-22

## 2023-11-22 ENCOUNTER — OFFICE VISIT (OUTPATIENT)
Dept: INTERNAL MEDICINE | Facility: CLINIC | Age: 53
End: 2023-11-22
Payer: COMMERCIAL

## 2023-11-22 VITALS
HEART RATE: 67 BPM | WEIGHT: 215.8 LBS | OXYGEN SATURATION: 97 % | BODY MASS INDEX: 30.9 KG/M2 | SYSTOLIC BLOOD PRESSURE: 138 MMHG | HEIGHT: 70 IN | DIASTOLIC BLOOD PRESSURE: 76 MMHG

## 2023-11-22 DIAGNOSIS — I25.2 HISTORY OF HEART ATTACK: ICD-10-CM

## 2023-11-22 DIAGNOSIS — E78.5 HYPERLIPIDEMIA LDL GOAL <100: ICD-10-CM

## 2023-11-22 DIAGNOSIS — Z78.9 ALCOHOL USE: ICD-10-CM

## 2023-11-22 DIAGNOSIS — I25.10 CORONARY ARTERY DISEASE INVOLVING NATIVE HEART WITHOUT ANGINA PECTORIS, UNSPECIFIED VESSEL OR LESION TYPE: Primary | ICD-10-CM

## 2023-11-22 DIAGNOSIS — Z87.891 FORMER SMOKER: ICD-10-CM

## 2023-11-22 DIAGNOSIS — F41.9 ANXIETY: ICD-10-CM

## 2023-11-22 DIAGNOSIS — E11.9 TYPE 2 DIABETES MELLITUS WITHOUT COMPLICATION, WITHOUT LONG-TERM CURRENT USE OF INSULIN (H): ICD-10-CM

## 2023-11-22 DIAGNOSIS — E11.9 TYPE 2 DIABETES MELLITUS WITHOUT COMPLICATION, WITHOUT LONG-TERM CURRENT USE OF INSULIN (H): Primary | ICD-10-CM

## 2023-11-22 DIAGNOSIS — G47.33 OSA (OBSTRUCTIVE SLEEP APNEA): ICD-10-CM

## 2023-11-22 DIAGNOSIS — M25.511 ACUTE PAIN OF RIGHT SHOULDER: ICD-10-CM

## 2023-11-22 DIAGNOSIS — I65.21 CAROTID STENOSIS, SYMPTOMATIC W/O INFARCT, RIGHT: ICD-10-CM

## 2023-11-22 DIAGNOSIS — Z86.73 HISTORY OF STROKE: ICD-10-CM

## 2023-11-22 PROCEDURE — 99214 OFFICE O/P EST MOD 30 MIN: CPT | Performed by: NURSE PRACTITIONER

## 2023-11-22 RX ORDER — BLOOD-GLUCOSE METER
1 EACH MISCELLANEOUS ONCE
Qty: 1 KIT | Refills: 0 | Status: SHIPPED | OUTPATIENT
Start: 2023-11-22 | End: 2023-11-22

## 2023-11-22 RX ORDER — BLOOD-GLUCOSE SENSOR
1 EACH MISCELLANEOUS
Qty: 2 EACH | Refills: 5 | Status: SHIPPED | OUTPATIENT
Start: 2023-11-22 | End: 2024-09-26

## 2023-11-22 RX ORDER — LANCETS
100 EACH MISCELLANEOUS 4 TIMES DAILY
Qty: 100 EACH | Refills: 4 | Status: SHIPPED | OUTPATIENT
Start: 2023-11-22 | End: 2024-09-26

## 2023-11-22 ASSESSMENT — PAIN SCALES - GENERAL: PAINLEVEL: NO PAIN (0)

## 2023-11-22 NOTE — PROGRESS NOTES
"  Assessment & Plan   Problem List Items Addressed This Visit          Endocrine    Diabetes mellitus, type 2 (H)    Relevant Medications    Continuous Blood Gluc Sensor (FREESTYLE GWYN 3 SENSOR) MISC    Blood Glucose Monitoring Suppl (CONTOUR NEXT ONE) KIT    CONTOUR NEXT TEST test strip    Microlet Lancets MISC    Other Relevant Orders    Hemoglobin A1c     Other Visit Diagnoses       Coronary artery disease involving native heart without angina pectoris, unspecified vessel or lesion type    -  Primary    Former smoker        History of stroke        History of heart attack        Hyperlipidemia LDL goal <100        Anxiety        DIMAS (obstructive sleep apnea)        Alcohol use        Carotid stenosis, symptomatic w/o infarct, right        Acute pain of right shoulder            Patient  was feeling trembly and was advised to reduce to 50mg of Zoloft.  Patient reports no desire to drink and continues on naltrexone in addition to using online support and finds this helpful. Patient has been to treatment in the past, patient does have upcoming appointment for additional alcohol assessment though is open to available resource. Patient has upcoming appointment with mental health provider.     Patient is currently wearing freestyle gwyn and is monitoring blood sugars consistently multiple times a day.     Right Shoulder clicking will recommend OTC pain reliever tylenol, avoid activities that worsen symptoms.  Would recommend imaging and follow up with orthopedics.      [facility]         BMI:   Estimated body mass index is 31.24 kg/m  as calculated from the following:    Height as of this encounter: 1.77 m (5' 9.69\").    Weight as of this encounter: 97.9 kg (215 lb 12.8 oz).   Weight management plan: Discussed healthy diet and exercise guidelines  Wt Readings from Last 5 Encounters:   11/22/23 97.9 kg (215 lb 12.8 oz)   08/02/23 " 102.8 kg (226 lb 11.2 oz)   22 93.4 kg (205 lb 14.4 oz)   10/29/21 102.7 kg (226 lb 6.4 oz)   21 100.8 kg (222 lb 4.8 oz)       MEDICATIONS:   Orders Placed This Encounter   Medications    Continuous Blood Gluc Sensor (FREESTYLE GWYN 3 SENSOR) MISC     Si each every 14 days Use 1 sensor every 14 days.     Dispense:  2 each     Refill:  5    Blood Glucose Monitoring Suppl (CONTOUR NEXT ONE) KIT     Si each once for 1 dose     Dispense:  1 kit     Refill:  0    CONTOUR NEXT TEST test strip     Sig: Use to test blood sugar 4 times daily.     Dispense:  100 strip     Refill:  1    Microlet Lancets MISC     Si each 4 times daily Use to test blood sugar 4 daily.     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 and talk about pills.    He eats 2-3 servings of fruits and vegetables daily.He consumes 0 sweetened beverage(s) daily.He exercises with enough effort to increase his heart rate 20 to 29 minutes per day.  He exercises with enough effort to increase his heart rate 4 days per week.   He is taking medications regularly.         Medication Followup of Sertraline 50 mg and Naltrexone   Taking Medication as prescribed: yes  Side Effects:  None  Medication Helping Symptoms:  Hard to tell         Review of Systems   Constitutional, HEENT, cardiovascular, pulmonary, GI, , musculoskeletal, neuro, skin, endocrine and psych  "systems are negative, except as otherwise noted.      Objective    BP (!) 142/80 (BP Location: Left arm, Patient Position: Sitting, Cuff Size: Adult Large)   Pulse 67   Ht 1.77 m (5' 9.69\")   Wt 97.9 kg (215 lb 12.8 oz)   SpO2 97%   BMI 31.24 kg/m    Body mass index is 31.24 kg/m .  Physical Exam   GENERAL: healthy, alert and no distress  EYES: Eyes grossly normal to inspection,  conjunctivae and sclerae normal  NECK: no adenopathy, healing right carotid endarterectomy   RESP: lungs clear to auscultation - no rales, rhonchi or wheezes  CV: regular rate and rhythm, normal S1 S2,  no peripheral edema and peripheral pulses strong  MS: no gross musculoskeletal defects noted, no edema  PSYCH: mentation appears normal, affect normal/bright    Office Visit on 08/02/2023   Component Date Value Ref Range Status    Hemoglobin A1C 08/02/2023 8.3 (H)  0.0 - 5.6 % Final    Creatinine Urine mg/dL 08/02/2023 165.0  mg/dL Final    The reference ranges have not been established in urine creatinine. The results should be integrated into the clinical context for interpretation.    Albumin Urine mg/L 08/02/2023 15.8  mg/L Final    The reference ranges have not been established in urine albumin. The results should be integrated into the clinical context for interpretation.    Albumin Urine mg/g Cr 08/02/2023 9.58  0.00 - 17.00 mg/g Cr Final    Microalbuminuria is defined as an albumin:creatinine ratio of 17 to 299 for males and 25 to 299 for females. A ratio of albumin:creatinine of 300 or higher is indicative of overt proteinuria.  Due to biologic variability, positive results should be confirmed by a second, first-morning random or 24-hour timed urine specimen. If there is discrepancy, a third specimen is recommended. When 2 out of 3 results are in the microalbuminuria range, this is evidence for incipient nephropathy and warrants increased efforts at glucose control, blood pressure control, and institution of therapy with an " angiotensin-converting-enzyme (ACE) inhibitor (if the patient can tolerate it).      Cholesterol 08/02/2023 160  <200 mg/dL Final    Triglycerides 08/02/2023 158 (H)  <150 mg/dL Final    Direct Measure HDL 08/02/2023 38 (L)  >=40 mg/dL Final    LDL Cholesterol Calculated 08/02/2023 90  <=100 mg/dL Final    Non HDL Cholesterol 08/02/2023 122  <130 mg/dL Final    Sodium 08/02/2023 137  136 - 145 mmol/L Final    Potassium 08/02/2023 4.1  3.4 - 5.3 mmol/L Final    Chloride 08/02/2023 101  98 - 107 mmol/L Final    Carbon Dioxide (CO2) 08/02/2023 24  22 - 29 mmol/L Final    Anion Gap 08/02/2023 12  7 - 15 mmol/L Final    Urea Nitrogen 08/02/2023 15.5  6.0 - 20.0 mg/dL Final    Creatinine 08/02/2023 0.82  0.67 - 1.17 mg/dL Final    Calcium 08/02/2023 9.7  8.6 - 10.0 mg/dL Final    Glucose 08/02/2023 199 (H)  70 - 99 mg/dL Final    Alkaline Phosphatase 08/02/2023 116  40 - 129 U/L Final    AST 08/02/2023 29  0 - 45 U/L Final    Reference intervals for this test were updated on 6/12/2023 to more accurately reflect our healthy population. There may be differences in the flagging of prior results with similar values performed with this method. Interpretation of those prior results can be made in the context of the updated reference intervals.    ALT 08/02/2023 53  0 - 70 U/L Final    Reference intervals for this test were updated on 6/12/2023 to more accurately reflect our healthy population. There may be differences in the flagging of prior results with similar values performed with this method. Interpretation of those prior results can be made in the context of the updated reference intervals.      Protein Total 08/02/2023 7.5  6.4 - 8.3 g/dL Final    Albumin 08/02/2023 4.8  3.5 - 5.2 g/dL Final    Bilirubin Total 08/02/2023 0.5  <=1.2 mg/dL Final    GFR Estimate 08/02/2023 >90  >60 mL/min/1.73m2 Final    Prostate Specific Antigen Screen 08/02/2023 0.43  0.00 - 3.50 ng/mL Final    WBC Count 08/02/2023 6.6  4.0 - 11.0 10e3/uL  Final    RBC Count 08/02/2023 5.26  4.40 - 5.90 10e6/uL Final    Hemoglobin 08/02/2023 15.9  13.3 - 17.7 g/dL Final    Hematocrit 08/02/2023 45.2  40.0 - 53.0 % Final    MCV 08/02/2023 86  78 - 100 fL Final    MCH 08/02/2023 30.2  26.5 - 33.0 pg Final    MCHC 08/02/2023 35.2  31.5 - 36.5 g/dL Final    RDW 08/02/2023 12.1  10.0 - 15.0 % Final    Platelet Count 08/02/2023 237  150 - 450 10e3/uL Final    % Neutrophils 08/02/2023 68  % Final    % Lymphocytes 08/02/2023 19  % Final    % Monocytes 08/02/2023 7  % Final    % Eosinophils 08/02/2023 5  % Final    % Basophils 08/02/2023 1  % Final    % Immature Granulocytes 08/02/2023 0  % Final    Absolute Neutrophils 08/02/2023 4.5  1.6 - 8.3 10e3/uL Final    Absolute Lymphocytes 08/02/2023 1.3  0.8 - 5.3 10e3/uL Final    Absolute Monocytes 08/02/2023 0.5  0.0 - 1.3 10e3/uL Final    Absolute Eosinophils 08/02/2023 0.3  0.0 - 0.7 10e3/uL Final    Absolute Basophils 08/02/2023 0.0  0.0 - 0.2 10e3/uL Final    Absolute Immature Granulocytes 08/02/2023 0.0  <=0.4 10e3/uL Final                   Current Outpatient Medications:     Blood Glucose Monitoring Suppl (CONTOUR NEXT ONE) KIT, 1 each once for 1 dose, Disp: 1 kit, Rfl: 0    Continuous Blood Gluc Sensor (FREESTYLE GWYN 3 SENSOR) MISC, 1 each every 14 days Use 1 sensor every 14 days., Disp: 2 each, Rfl: 5    CONTOUR NEXT TEST test strip, Use to test blood sugar 4 times daily., Disp: 100 strip, Rfl: 1    Microlet Lancets MISC, 100 each 4 times daily Use to test blood sugar 4 daily., Disp: 100 each, Rfl: 4    aspirin 81 mg chewable tablet, [ASPIRIN 81 MG CHEWABLE TABLET] Chew 81 mg., Disp: , Rfl:     atorvastatin (LIPITOR) 40 MG tablet, Take 1 tablet (40 mg) by mouth At Bedtime, Disp: 90 tablet, Rfl: 3    clopidogrel (PLAVIX) 75 MG tablet, Take 1 tablet (75 mg) by mouth daily, Disp: 90 tablet, Rfl: 3    Continuous Blood Gluc Sensor (FREESTYLE GWYN 3 SENSOR) MISC, 1 each every 14 days Use 1 sensor every 14 days., Disp: 2  each, Rfl: 5    glucosamine-chondroit-vit C-Mn 500-400 mg cap, [GLUCOSAMINE-CHONDROIT-VIT C--400 MG CAP] Take 1 tablet by mouth daily., Disp: , Rfl:     hydrOXYzine (VISTARIL) 25 MG capsule, Take 1 capsule (25 mg) by mouth 3 times daily as needed for itching, Disp: 30 capsule, Rfl: 3    metFORMIN (GLUCOPHAGE XR) 500 MG 24 hr tablet, Take 2 tablets (1,000 mg) by mouth 2 times daily (with meals), Disp: 90 tablet, Rfl: 3    naltrexone (DEPADE/REVIA) 50 MG tablet, Take 1 tablet (50 mg) by mouth daily Take 1/2 tablet for 3-5 days to avoid side-effects (most common are nausea, headache) Do not start until 11/13/2023, Disp: 30 tablet, Rfl: 1    sertraline (ZOLOFT) 100 MG tablet, Take 1 tablet (100 mg) by mouth daily, Disp: 30 tablet, Rfl: 5    Wt Readings from Last 5 Encounters:   11/22/23 97.9 kg (215 lb 12.8 oz)   08/02/23 102.8 kg (226 lb 11.2 oz)   07/28/22 93.4 kg (205 lb 14.4 oz)   10/29/21 102.7 kg (226 lb 6.4 oz)   04/14/21 100.8 kg (222 lb 4.8 oz)

## 2023-11-24 NOTE — TELEPHONE ENCOUNTER
Central Prior Authorization Team   Phone: [phone]        PRIOR AUTHORIZATION DENIED    Medication:    Insurance Company: [employer] - Phone [phone] Fax [phone]  Denial Date: 11/22/2023  Denial Rational: The preferred products for your plan are: A) Accu-Chek, and B)OneTouch        Appeal Information:         Patient Notified: No. Please note: Providers/Clinics are to notify the patients of the denial outcomes and steps going forward.

## 2023-11-24 NOTE — TELEPHONE ENCOUNTER
Central Prior Authorization Team   Phone: [phone]        PRIOR AUTHORIZATION DENIED    Medication: CONTOUR NEXT TEST VI  Insurance Company: [employer] - Phone [phone] Fax [phone]  Denial Date: 11/22/2023  Denial Rational: The preferred products for your plan are: A) Accu-Chek, and B)OneTouch        Appeal Information:         Patient Notified: No. Please note: Providers/Clinics are to notify the patients of the denial outcomes and steps going forward.

## 2023-11-27 ENCOUNTER — MYC MEDICAL ADVICE (OUTPATIENT)
Dept: INTERNAL MEDICINE | Facility: CLINIC | Age: 53
End: 2023-11-27

## 2023-11-27 ENCOUNTER — HOSPITAL ENCOUNTER (OUTPATIENT)
Dept: BEHAVIORAL HEALTH | Facility: CLINIC | Age: 53
Discharge: HOME | End: 2023-11-27
Attending: FAMILY MEDICINE | Admitting: FAMILY MEDICINE
Payer: COMMERCIAL

## 2023-11-27 ENCOUNTER — TELEPHONE (OUTPATIENT)
Dept: BEHAVIORAL HEALTH | Facility: CLINIC | Age: 53
End: 2023-11-27

## 2023-11-27 VITALS — HEIGHT: 70 IN | BODY MASS INDEX: 30.78 KG/M2 | WEIGHT: 215 LBS

## 2023-11-27 PROCEDURE — H0001 ALCOHOL AND/OR DRUG ASSESS: HCPCS

## 2023-11-27 ASSESSMENT — COLUMBIA-SUICIDE SEVERITY RATING SCALE - C-SSRS
2. HAVE YOU ACTUALLY HAD ANY THOUGHTS OF KILLING YOURSELF?: NO
1. IN THE PAST MONTH, HAVE YOU WISHED YOU WERE DEAD OR WISHED YOU COULD GO TO SLEEP AND NOT WAKE UP?: NO
1. HAVE YOU WISHED YOU WERE DEAD OR WISHED YOU COULD GO TO SLEEP AND NOT WAKE UP?: YES

## 2023-11-27 ASSESSMENT — PAIN SCALES - GENERAL: PAINLEVEL: NO PAIN (0)
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
## 2023-11-28 ENCOUNTER — TELEPHONE (OUTPATIENT)
Dept: BEHAVIORAL HEALTH | Facility: CLINIC | Age: 53
End: 2023-11-28
Payer: COMMERCIAL

## 2023-11-28 RX ORDER — BISACODYL 5 MG/1
TABLET, DELAYED RELEASE ORAL
Qty: 4 TABLET | Refills: 0 | Status: SHIPPED | OUTPATIENT
Start: 2023-11-28 | End: 2024-09-26

## 2023-11-28 NOTE — TELEPHONE ENCOUNTER
Standard Golytely Bowel Prep. Instructions were sent via [employer]. Bowel prep was sent 11/28/2023 to [facility]ARM[facility]Y #[id number] - [place] [id number] AT [facility].      [place] RN on 11/28/2023 at 12:15 PM

## 2023-11-29 RX ORDER — LANCETS
EACH MISCELLANEOUS
Qty: 100 EACH | Refills: 4 | Status: SHIPPED | OUTPATIENT
Start: 2023-11-29 | End: 2024-09-26

## 2023-11-29 RX ORDER — BLOOD SUGAR DIAGNOSTIC
STRIP MISCELLANEOUS
Qty: 300 STRIP | Refills: 11 | Status: SHIPPED | OUTPATIENT
Start: 2023-11-29 | End: 2023-12-01

## 2023-11-29 RX ORDER — BLOOD-GLUCOSE METER
1 EACH MISCELLANEOUS PRN
Qty: 1 KIT | Refills: 0 | Status: SHIPPED | OUTPATIENT
Start: 2023-11-29 | End: 2024-09-26

## 2023-11-30 ENCOUNTER — MYC MEDICAL ADVICE (OUTPATIENT)
Dept: INTERNAL MEDICINE | Facility: CLINIC | Age: 53
End: 2023-11-30
Payer: COMMERCIAL

## 2023-11-30 DIAGNOSIS — E11.9 TYPE 2 DIABETES MELLITUS WITHOUT COMPLICATION, WITHOUT LONG-TERM CURRENT USE OF INSULIN (H): ICD-10-CM

## 2023-12-01 RX ORDER — BLOOD SUGAR DIAGNOSTIC
STRIP MISCELLANEOUS
Qty: 300 STRIP | Refills: 11 | Status: SHIPPED | OUTPATIENT
Start: 2023-12-01 | End: 2024-09-26

## 2023-12-19 DIAGNOSIS — E11.9 TYPE 2 DIABETES MELLITUS WITHOUT COMPLICATION, WITHOUT LONG-TERM CURRENT USE OF INSULIN (H): ICD-10-CM

## 2023-12-19 RX ORDER — METFORMIN HCL 500 MG
1000 TABLET, EXTENDED RELEASE 24 HR ORAL 2 TIMES DAILY WITH MEALS
Qty: 180 TABLET | Refills: 0 | Status: SHIPPED | OUTPATIENT
Start: 2023-12-19 | End: 2024-02-07

## 2023-12-19 NOTE — TELEPHONE ENCOUNTER
Received fax from pharmacy requesting refill for metformin    Last refill: 08/28/2023  Patient last seen: 11/22/2023

## 2023-12-21 NOTE — TELEPHONE ENCOUNTER
Medication Question or Clarification  Who is calling: Patient   What medication are you calling about (include dose and sig)?: clopidogreL (PLAVIX) 75 mg tablet  90 tablet  0  5/20/2020 5/20/2021    Sig - Route: Take 1 tablet (75 mg total) by mouth daily. - Oral   Who prescribed the medication?: Radha Reynolds CNP  What is your question/concern?: Patient states his Rx for Clopidogrel was sent to wrong pharmacy [id number] they said they did not received Rx patient is requesting to send Clopidogrel Rx to [facility] # [id number].   Requested Pharmacy: [facility] # [id number]  Okay to leave a detailed message?: No         [FreeTextEntry1] : IMP: 30 yo M here for follow up for rectal pain. Patient reports symptomatic relief after use of topical rectal cream. He is feeling better, overall.  On exam, found to have grade II hemorrhoids.   Patient was placed in left lateral position. Digital exam was done. Then the anoscopy with the obturator was introduced. After insertion of the scope the obturator was retracted and the area was visualized after insufflating with air. Findings: no masses , no bleeding; grade II hemorrhoids ; internal external/inflamed

## 2023-12-23 ENCOUNTER — HEALTH MAINTENANCE LETTER (OUTPATIENT)
Age: 53
End: 2023-12-23

## 2024-01-11 ENCOUNTER — OFFICE VISIT (OUTPATIENT)
Dept: INTERNAL MEDICINE | Facility: CLINIC | Age: 54
End: 2024-01-11
Payer: COMMERCIAL

## 2024-01-11 VITALS
RESPIRATION RATE: 16 BRPM | BODY MASS INDEX: 29.69 KG/M2 | HEART RATE: 58 BPM | DIASTOLIC BLOOD PRESSURE: 70 MMHG | TEMPERATURE: 97.8 F | SYSTOLIC BLOOD PRESSURE: 128 MMHG | OXYGEN SATURATION: 96 % | WEIGHT: 206.9 LBS

## 2024-01-11 DIAGNOSIS — E11.9 TYPE 2 DIABETES MELLITUS WITHOUT COMPLICATION, WITHOUT LONG-TERM CURRENT USE OF INSULIN (H): Primary | ICD-10-CM

## 2024-01-11 DIAGNOSIS — E11.9 DIABETES MELLITUS, TYPE 2 (H): ICD-10-CM

## 2024-01-11 DIAGNOSIS — I25.2 HISTORY OF HEART ATTACK: ICD-10-CM

## 2024-01-11 DIAGNOSIS — G47.33 OSA (OBSTRUCTIVE SLEEP APNEA): ICD-10-CM

## 2024-01-11 DIAGNOSIS — Z87.891 FORMER SMOKER: ICD-10-CM

## 2024-01-11 DIAGNOSIS — Z86.73 HISTORY OF STROKE: ICD-10-CM

## 2024-01-11 DIAGNOSIS — F41.9 ANXIETY: ICD-10-CM

## 2024-01-11 DIAGNOSIS — E78.5 HYPERLIPIDEMIA LDL GOAL <100: ICD-10-CM

## 2024-01-11 DIAGNOSIS — I25.10 CORONARY ARTERY DISEASE INVOLVING NATIVE HEART WITHOUT ANGINA PECTORIS, UNSPECIFIED VESSEL OR LESION TYPE: ICD-10-CM

## 2024-01-11 LAB
ALBUMIN SERPL BCG-MCNC: 4.6 G/DL (ref 3.5–5.2)
ALP SERPL-CCNC: 119 U/L (ref 40–150)
ALT SERPL W P-5'-P-CCNC: 33 U/L (ref 0–70)
ANION GAP SERPL CALCULATED.3IONS-SCNC: 10 MMOL/L (ref 7–15)
AST SERPL W P-5'-P-CCNC: 25 U/L (ref 0–45)
BILIRUB SERPL-MCNC: 0.4 MG/DL
BUN SERPL-MCNC: 14.9 MG/DL (ref 6–20)
CALCIUM SERPL-MCNC: 10.2 MG/DL (ref 8.6–10)
CHLORIDE SERPL-SCNC: 104 MMOL/L (ref 98–107)
CHOLEST SERPL-MCNC: 131 MG/DL
CREAT SERPL-MCNC: 0.78 MG/DL (ref 0.67–1.17)
DEPRECATED HCO3 PLAS-SCNC: 27 MMOL/L (ref 22–29)
EGFRCR SERPLBLD CKD-EPI 2021: >90 ML/MIN/1.73M2
FASTING STATUS PATIENT QL REPORTED: YES
GLUCOSE SERPL-MCNC: 112 MG/DL (ref 70–99)
HBA1C MFR BLD: 5.8 % (ref 0–5.6)
HDLC SERPL-MCNC: 35 MG/DL
LDLC SERPL CALC-MCNC: 69 MG/DL
NONHDLC SERPL-MCNC: 96 MG/DL
POTASSIUM SERPL-SCNC: 4.8 MMOL/L (ref 3.4–5.3)
PROT SERPL-MCNC: 7.1 G/DL (ref 6.4–8.3)
SODIUM SERPL-SCNC: 141 MMOL/L (ref 135–145)
TRIGL SERPL-MCNC: 134 MG/DL

## 2024-01-11 PROCEDURE — 80061 LIPID PANEL: CPT | Performed by: NURSE PRACTITIONER

## 2024-01-11 PROCEDURE — 83036 HEMOGLOBIN GLYCOSYLATED A1C: CPT | Performed by: NURSE PRACTITIONER

## 2024-01-11 PROCEDURE — 99214 OFFICE O/P EST MOD 30 MIN: CPT | Performed by: NURSE PRACTITIONER

## 2024-01-11 PROCEDURE — 80053 COMPREHEN METABOLIC PANEL: CPT | Performed by: NURSE PRACTITIONER

## 2024-01-11 PROCEDURE — 36415 COLL VENOUS BLD VENIPUNCTURE: CPT | Performed by: NURSE PRACTITIONER

## 2024-01-11 NOTE — PROGRESS NOTES
Assessment & Plan   Problem List Items Addressed This Visit       Diabetes mellitus, type 2 (H) - Primary    Relevant Orders    Adult Eye  Referral    Hemoglobin A1c     Other Visit Diagnoses       Coronary artery disease involving native heart without angina pectoris, unspecified vessel or lesion type        Relevant Orders    Comprehensive metabolic panel    Lipid Profile (Chol, Trig, HDL, LDL calc)    Former smoker        History of stroke        History of heart attack        Hyperlipidemia LDL goal <100        Anxiety        DIMAS (obstructive sleep apnea)        BMI 29.0-29.9,adult               Patient blood sugars are remaining within target range. He is checking blood sugars regularly.  His weight is down 9 pounds and he continues to work on diet and exercise    He has taken the anxiety medications quite sparingly and is following up with counseling.  He and his significant other have elected to divorce and she has moved out. 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     Patient cancelled his sleep medicine as he reports he is sleeping well and feels due to lifestyle changes and weight loss he has elected to wait.      He will schedule an diabetic eye exam.           FUTURE APPOINTMENTS:       - Follow-up visit in 3 months     Radha Reynolds NP  [facility]    Paco Painter is a 53 year old, presenting for the following health issues:  Follow Up and Diabetes        1/11/2024     6:50 AM   Additional Questions   Roomed by Ari Barnard   Accompanied by N/A       History of Present Illness       Diabetes:   He presents for follow up of diabetes.  He is checking home blood glucose two times daily.   He checks blood glucose before meals.  Blood glucose is never over 200 and never under 70. He is aware of hypoglycemia symptoms including none.    He has no concerns regarding his diabetes at this time.   He is not experiencing numbness or burning in feet, excessive  thirst, blurry vision, weight changes or redness, sores or blisters on feet. The patient has not had a diabetic eye exam in the last 12 months.      He consumes 0 sweetened beverage(s) daily. He exercises with enough effort to increase his heart rate 5 days per week.   He is taking medications regularly.         Diabetes Follow-up        Hyperlipidemia Follow-Up    Are you regularly taking any medication or supplement to lower your cholesterol?   Yes-    Are you having muscle aches or other side effects that you think could be caused by your cholesterol lowering medication?  Yes-      Hypertension Follow-up    Do you check your blood pressure regularly outside of the clinic? No   Are you following a low salt diet? Yes  Are your blood pressures ever more than 140 on the top number (systolic) OR more   than 90 on the bottom number (diastolic), for example 140/90? No    BP Readings from Last 2 Encounters:   01/11/24 128/70   11/22/23 138/76     Hemoglobin A1C (%)   Date Value   08/02/2023 8.3 (H)   07/28/2022 5.8 (H)     LDL Cholesterol Calculated (mg/dL)   Date Value   08/02/2023 90   07/28/2022 114 (H)       Review of Systems   Constitutional, HEENT, cardiovascular, pulmonary, GI, , musculoskeletal, neuro, skin, endocrine and psych systems are negative, except as otherwise noted.      Objective    /70 (BP Location: Right arm, Patient Position: Sitting)   Pulse 58   Temp 97.8  F (36.6  C) (Oral)   Resp 16   Wt 93.8 kg (206 lb 14.4 oz)   SpO2 96%   BMI 29.69 kg/m    Body mass index is 29.69 kg/m .  Physical Exam   GENERAL: healthy, alert and no distress  EYES: Eyes grossly normal to inspection,conjunctivae and sclerae normal  RESP: lungs clear to auscultation - no rales, rhonchi or wheezes  CV: regular rate and rhythm, normal S1 S2, no peripheral edema and peripheral pulses strong  MS: no gross musculoskeletal defects noted, no edema  PSYCH: mentation appears normal, affect normal/bright    Office Visit  on 08/02/2023   Component Date Value Ref Range Status    Hemoglobin A1C 08/02/2023 8.3 (H)  0.0 - 5.6 % Final    Creatinine Urine mg/dL 08/02/2023 165.0  mg/dL Final    The reference ranges have not been established in urine creatinine. The results should be integrated into the clinical context for interpretation.    Albumin Urine mg/L 08/02/2023 15.8  mg/L Final    The reference ranges have not been established in urine albumin. The results should be integrated into the clinical context for interpretation.    Albumin Urine mg/g Cr 08/02/2023 9.58  0.00 - 17.00 mg/g Cr Final    Microalbuminuria is defined as an albumin:creatinine ratio of 17 to 299 for males and 25 to 299 for females. A ratio of albumin:creatinine of 300 or higher is indicative of overt proteinuria.  Due to biologic variability, positive results should be confirmed by a second, first-morning random or 24-hour timed urine specimen. If there is discrepancy, a third specimen is recommended. When 2 out of 3 results are in the microalbuminuria range, this is evidence for incipient nephropathy and warrants increased efforts at glucose control, blood pressure control, and institution of therapy with an angiotensin-converting-enzyme (ACE) inhibitor (if the patient can tolerate it).      Cholesterol 08/02/2023 160  <200 mg/dL Final    Triglycerides 08/02/2023 158 (H)  <150 mg/dL Final    Direct Measure HDL 08/02/2023 38 (L)  >=40 mg/dL Final    LDL Cholesterol Calculated 08/02/2023 90  <=100 mg/dL Final    Non HDL Cholesterol 08/02/2023 122  <130 mg/dL Final    Sodium 08/02/2023 137  136 - 145 mmol/L Final    Potassium 08/02/2023 4.1  3.4 - 5.3 mmol/L Final    Chloride 08/02/2023 101  98 - 107 mmol/L Final    Carbon Dioxide (CO2) 08/02/2023 24  22 - 29 mmol/L Final    Anion Gap 08/02/2023 12  7 - 15 mmol/L Final    Urea Nitrogen 08/02/2023 15.5  6.0 - 20.0 mg/dL Final    Creatinine 08/02/2023 0.82  0.67 - 1.17 mg/dL Final    Calcium 08/02/2023 9.7  8.6 -  10.0 mg/dL Final    Glucose 08/02/2023 199 (H)  70 - 99 mg/dL Final    Alkaline Phosphatase 08/02/2023 116  40 - 129 U/L Final    AST 08/02/2023 29  0 - 45 U/L Final    Reference intervals for this test were updated on 6/12/2023 to more accurately reflect our healthy population. There may be differences in the flagging of prior results with similar values performed with this method. Interpretation of those prior results can be made in the context of the updated reference intervals.    ALT 08/02/2023 53  0 - 70 U/L Final    Reference intervals for this test were updated on 6/12/2023 to more accurately reflect our healthy population. There may be differences in the flagging of prior results with similar values performed with this method. Interpretation of those prior results can be made in the context of the updated reference intervals.      Protein Total 08/02/2023 7.5  6.4 - 8.3 g/dL Final    Albumin 08/02/2023 4.8  3.5 - 5.2 g/dL Final    Bilirubin Total 08/02/2023 0.5  <=1.2 mg/dL Final    GFR Estimate 08/02/2023 >90  >60 mL/min/1.73m2 Final    Prostate Specific Antigen Screen 08/02/2023 0.43  0.00 - 3.50 ng/mL Final    WBC Count 08/02/2023 6.6  4.0 - 11.0 10e3/uL Final    RBC Count 08/02/2023 5.26  4.40 - 5.90 10e6/uL Final    Hemoglobin 08/02/2023 15.9  13.3 - 17.7 g/dL Final    Hematocrit 08/02/2023 45.2  40.0 - 53.0 % Final    MCV 08/02/2023 86  78 - 100 fL Final    MCH 08/02/2023 30.2  26.5 - 33.0 pg Final    MCHC 08/02/2023 35.2  31.5 - 36.5 g/dL Final    RDW 08/02/2023 12.1  10.0 - 15.0 % Final    Platelet Count 08/02/2023 237  150 - 450 10e3/uL Final    % Neutrophils 08/02/2023 68  % Final    % Lymphocytes 08/02/2023 19  % Final    % Monocytes 08/02/2023 7  % Final    % Eosinophils 08/02/2023 5  % Final    % Basophils 08/02/2023 1  % Final    % Immature Granulocytes 08/02/2023 0  % Final    Absolute Neutrophils 08/02/2023 4.5  1.6 - 8.3 10e3/uL Final    Absolute Lymphocytes 08/02/2023 1.3  0.8 - 5.3  10e3/uL Final    Absolute Monocytes 08/02/2023 0.5  0.0 - 1.3 10e3/uL Final    Absolute Eosinophils 08/02/2023 0.3  0.0 - 0.7 10e3/uL Final    Absolute Basophils 08/02/2023 0.0  0.0 - 0.2 10e3/uL Final    Absolute Immature Granulocytes 08/02/2023 0.0  <=0.4 10e3/uL Final                 Current Outpatient Medications:     ACCU-CHEK GUIDE test strip, Use to test blood sugar 1 daily., Disp: 300 strip, Rfl: 11    aspirin 81 mg chewable tablet, [ASPIRIN 81 MG CHEWABLE TABLET] Chew 81 mg., Disp: , Rfl:     atorvastatin (LIPITOR) 40 MG tablet, Take 1 tablet (40 mg) by mouth At Bedtime, Disp: 90 tablet, Rfl: 3    bisacodyl (DULCOLAX) 5 MG EC tablet, Take 2 tablets at 3 pm the day before your procedure. If your procedure is before 11 am, take 2 additional tablets at 11 pm. If your procedure is after 11 am, take 2 additional tablets at 6 am. For additional instructions refer to your colonoscopy prep instructions., Disp: 4 tablet, Rfl: 0    blood glucose monitoring (SOFTCLIX) lancets, Use to test blood sugar 1 times daily., Disp: 100 each, Rfl: 4    Blood Glucose Monitoring Suppl (ACCU-CHEK GUIDE ME) w/Device KIT, 1 each as needed, Disp: 1 kit, Rfl: 0    clopidogrel (PLAVIX) 75 MG tablet, Take 1 tablet (75 mg) by mouth daily, Disp: 90 tablet, Rfl: 3    CONTOUR NEXT TEST test strip, Use to test blood sugar 4 times daily., Disp: 100 strip, Rfl: 1    glucosamine-chondroit-vit C-Mn 500-400 mg cap, [GLUCOSAMINE-CHONDROIT-VIT C--400 MG CAP] Take 1 tablet by mouth daily., Disp: , Rfl:     hydrOXYzine (VISTARIL) 25 MG capsule, Take 1 capsule (25 mg) by mouth 3 times daily as needed for itching, Disp: 30 capsule, Rfl: 3    metFORMIN (GLUCOPHAGE XR) 500 MG 24 hr tablet, Take 2 tablets (1,000 mg) by mouth 2 times daily (with meals), Disp: 180 tablet, Rfl: 0    Microlet Lancets MISC, 100 each 4 times daily Use to test blood sugar 4 daily., Disp: 100 each, Rfl: 4    naltrexone (DEPADE/REVIA) 50 MG tablet, Take 1 tablet (50 mg) by  mouth daily Take 1/2 tablet for 3-5 days to avoid side-effects (most common are nausea, headache) Do not start until 11/13/2023, Disp: 30 tablet, Rfl: 1    polyethylene glycol (GOLYTELY) 236 g suspension, The night before the exam at 6 pm drink an 8-ounce glass every 15 minutes until the jug is half empty. If you arrive before 11 AM: Drink the other half of the Golytely jug at 11 PM night before procedure. If you arrive after 11 AM: Drink the other half of the Golytely jug at 6 AM day of procedure. For additional instructions refer to your colonoscopy prep instructions., Disp: 4000 mL, Rfl: 0    sertraline (ZOLOFT) 100 MG tablet, Take 1 tablet (100 mg) by mouth daily, Disp: 30 tablet, Rfl: 5    Continuous Blood Gluc Sensor (FREESTYLE GWYN 3 SENSOR) MISC, 1 each every 14 days Use 1 sensor every 14 days. (Patient not taking: Reported on 11/27/2023), Disp: 2 each, Rfl: 5    Continuous Blood Gluc Sensor (FREESTYLE GWYN 3 SENSOR) MISC, 1 each every 14 days Use 1 sensor every 14 days. (Patient not taking: Reported on 11/27/2023), Disp: 2 each, Rfl: 5  Wt Readings from Last 5 Encounters:   01/11/24 93.8 kg (206 lb 14.4 oz)   11/27/23 97.5 kg (215 lb)   11/22/23 97.9 kg (215 lb 12.8 oz)   08/02/23 102.8 kg (226 lb 11.2 oz)   07/28/22 93.4 kg (205 lb 14.4 oz)

## 2024-02-07 DIAGNOSIS — E11.9 TYPE 2 DIABETES MELLITUS WITHOUT COMPLICATION, WITHOUT LONG-TERM CURRENT USE OF INSULIN (H): ICD-10-CM

## 2024-02-08 RX ORDER — METFORMIN HCL 500 MG
1000 TABLET, EXTENDED RELEASE 24 HR ORAL 2 TIMES DAILY WITH MEALS
Qty: 360 TABLET | Refills: 0 | Status: SHIPPED | OUTPATIENT
Start: 2024-02-08 | End: 2024-09-26

## 2024-05-05 ENCOUNTER — HEALTH MAINTENANCE LETTER (OUTPATIENT)
Age: 54
End: 2024-05-05

## 2024-05-29 DIAGNOSIS — F41.9 ANXIETY: ICD-10-CM

## 2024-05-29 RX ORDER — SERTRALINE HYDROCHLORIDE 100 MG/1
100 TABLET, FILM COATED ORAL DAILY
Qty: 90 TABLET | Refills: 1 | Status: SHIPPED | OUTPATIENT
Start: 2024-05-29 | End: 2024-09-26

## 2024-07-24 ENCOUNTER — TRANSFERRED RECORDS (OUTPATIENT)
Dept: HEALTH INFORMATION MANAGEMENT | Facility: CLINIC | Age: 54
End: 2024-07-24
Payer: COMMERCIAL

## 2024-08-21 DIAGNOSIS — E78.5 HYPERLIPIDEMIA LDL GOAL <100: ICD-10-CM

## 2024-08-21 RX ORDER — CLOPIDOGREL BISULFATE 75 MG/1
75 TABLET ORAL DAILY
Qty: 90 TABLET | Refills: 0 | Status: SHIPPED | OUTPATIENT
Start: 2024-08-21 | End: 2024-09-26

## 2024-08-21 NOTE — TELEPHONE ENCOUNTER
Medication Question or Refill    Contacts       Contact Date/Time Type Contact Phone/Fax    08/21/2024 10:24 AM CDT Phone (Incoming) Inocencio Macias (Self) [phone] (H)            What medication are you calling about (include dose and sig)?: clopidogrel (PLAVIX) 75 MG tablet     Preferred Pharmacy:    [facility]/pharmacy #[id number] - Closed - [place], [address] [place] [address]  [place] [address]  Phone: [phone] Fax: [phone]      Controlled Substance Agreement on file:   CSA -- Patient Level:    CSA: None found at the patient level.       Who prescribed the medication?: Radha Reynolds NP     Do you need a refill? Yes    When did you use the medication last? today    Patient offered an appointment? No Patient's 8/8/24 appointment has been rescheduled to 09/26/24.  Patient is also on call list in the event of cancellation to see PCP sooner.     Do you have any questions or concerns?  No      Could we send this information to you in [employer] or would you prefer to receive a phone call?:   Patient would like to be contacted via [employer]

## 2024-09-22 ENCOUNTER — HEALTH MAINTENANCE LETTER (OUTPATIENT)
Age: 54
End: 2024-09-22

## 2024-09-26 ENCOUNTER — OFFICE VISIT (OUTPATIENT)
Dept: INTERNAL MEDICINE | Facility: CLINIC | Age: 54
End: 2024-09-26
Payer: COMMERCIAL

## 2024-09-26 ENCOUNTER — ORDERS ONLY (AUTO-RELEASED) (OUTPATIENT)
Dept: INTERNAL MEDICINE | Facility: CLINIC | Age: 54
End: 2024-09-26

## 2024-09-26 VITALS
RESPIRATION RATE: 18 BRPM | WEIGHT: 199.8 LBS | HEART RATE: 60 BPM | OXYGEN SATURATION: 96 % | HEIGHT: 70 IN | DIASTOLIC BLOOD PRESSURE: 79 MMHG | BODY MASS INDEX: 28.6 KG/M2 | SYSTOLIC BLOOD PRESSURE: 137 MMHG | TEMPERATURE: 98 F

## 2024-09-26 DIAGNOSIS — F41.9 ANXIETY: ICD-10-CM

## 2024-09-26 DIAGNOSIS — Z86.73 HISTORY OF STROKE: ICD-10-CM

## 2024-09-26 DIAGNOSIS — Z12.11 SCREEN FOR COLON CANCER: ICD-10-CM

## 2024-09-26 DIAGNOSIS — Z12.11 SPECIAL SCREENING FOR MALIGNANT NEOPLASMS, COLON: ICD-10-CM

## 2024-09-26 DIAGNOSIS — Z87.891 FORMER SMOKER: ICD-10-CM

## 2024-09-26 DIAGNOSIS — Z12.5 SCREENING FOR PROSTATE CANCER: ICD-10-CM

## 2024-09-26 DIAGNOSIS — Z00.00 ANNUAL PHYSICAL EXAM: Primary | ICD-10-CM

## 2024-09-26 DIAGNOSIS — G47.33 OSA (OBSTRUCTIVE SLEEP APNEA): ICD-10-CM

## 2024-09-26 DIAGNOSIS — E11.9 TYPE 2 DIABETES MELLITUS WITHOUT COMPLICATION, WITHOUT LONG-TERM CURRENT USE OF INSULIN (H): ICD-10-CM

## 2024-09-26 DIAGNOSIS — E78.5 HYPERLIPIDEMIA LDL GOAL <100: ICD-10-CM

## 2024-09-26 DIAGNOSIS — I25.2 HISTORY OF HEART ATTACK: ICD-10-CM

## 2024-09-26 DIAGNOSIS — I25.10 CORONARY ARTERY DISEASE INVOLVING NATIVE HEART WITHOUT ANGINA PECTORIS, UNSPECIFIED VESSEL OR LESION TYPE: ICD-10-CM

## 2024-09-26 LAB
ALBUMIN SERPL BCG-MCNC: 4.6 G/DL (ref 3.5–5.2)
ALP SERPL-CCNC: 102 U/L (ref 40–150)
ALT SERPL W P-5'-P-CCNC: 36 U/L (ref 0–70)
ANION GAP SERPL CALCULATED.3IONS-SCNC: 11 MMOL/L (ref 7–15)
AST SERPL W P-5'-P-CCNC: 28 U/L (ref 0–45)
BASOPHILS # BLD AUTO: 0 10E3/UL (ref 0–0.2)
BASOPHILS NFR BLD AUTO: 0 %
BILIRUB SERPL-MCNC: 0.6 MG/DL
BUN SERPL-MCNC: 14.5 MG/DL (ref 6–20)
CALCIUM SERPL-MCNC: 9.5 MG/DL (ref 8.8–10.4)
CHLORIDE SERPL-SCNC: 104 MMOL/L (ref 98–107)
CHOLEST SERPL-MCNC: 117 MG/DL
CREAT SERPL-MCNC: 0.74 MG/DL (ref 0.67–1.17)
CREAT UR-MCNC: 141 MG/DL
EGFRCR SERPLBLD CKD-EPI 2021: >90 ML/MIN/1.73M2
EOSINOPHIL # BLD AUTO: 0.2 10E3/UL (ref 0–0.7)
EOSINOPHIL NFR BLD AUTO: 3 %
ERYTHROCYTE [DISTWIDTH] IN BLOOD BY AUTOMATED COUNT: 12.3 % (ref 10–15)
EST. AVERAGE GLUCOSE BLD GHB EST-MCNC: 131 MG/DL
FASTING STATUS PATIENT QL REPORTED: YES
FASTING STATUS PATIENT QL REPORTED: YES
GLUCOSE SERPL-MCNC: 118 MG/DL (ref 70–99)
HBA1C MFR BLD: 6.2 % (ref 0–5.6)
HCO3 SERPL-SCNC: 26 MMOL/L (ref 22–29)
HCT VFR BLD AUTO: 45.5 % (ref 40–53)
HDLC SERPL-MCNC: 40 MG/DL
HGB BLD-MCNC: 15.5 G/DL (ref 13.3–17.7)
IMM GRANULOCYTES # BLD: 0 10E3/UL
IMM GRANULOCYTES NFR BLD: 0 %
LDLC SERPL CALC-MCNC: 65 MG/DL
LYMPHOCYTES # BLD AUTO: 1.3 10E3/UL (ref 0.8–5.3)
LYMPHOCYTES NFR BLD AUTO: 22 %
MCH RBC QN AUTO: 30.5 PG (ref 26.5–33)
MCHC RBC AUTO-ENTMCNC: 34.1 G/DL (ref 31.5–36.5)
MCV RBC AUTO: 90 FL (ref 78–100)
MICROALBUMIN UR-MCNC: <12 MG/L
MICROALBUMIN/CREAT UR: NORMAL MG/G{CREAT}
MONOCYTES # BLD AUTO: 0.4 10E3/UL (ref 0–1.3)
MONOCYTES NFR BLD AUTO: 6 %
NEUTROPHILS # BLD AUTO: 4 10E3/UL (ref 1.6–8.3)
NEUTROPHILS NFR BLD AUTO: 68 %
NONHDLC SERPL-MCNC: 77 MG/DL
PLATELET # BLD AUTO: 222 10E3/UL (ref 150–450)
POTASSIUM SERPL-SCNC: 4.6 MMOL/L (ref 3.4–5.3)
PROT SERPL-MCNC: 7.1 G/DL (ref 6.4–8.3)
PSA SERPL DL<=0.01 NG/ML-MCNC: 0.56 NG/ML (ref 0–3.5)
RBC # BLD AUTO: 5.08 10E6/UL (ref 4.4–5.9)
SODIUM SERPL-SCNC: 141 MMOL/L (ref 135–145)
TRIGL SERPL-MCNC: 61 MG/DL
WBC # BLD AUTO: 5.8 10E3/UL (ref 4–11)

## 2024-09-26 PROCEDURE — 80053 COMPREHEN METABOLIC PANEL: CPT | Performed by: NURSE PRACTITIONER

## 2024-09-26 PROCEDURE — 80061 LIPID PANEL: CPT | Performed by: NURSE PRACTITIONER

## 2024-09-26 PROCEDURE — 82570 ASSAY OF URINE CREATININE: CPT | Performed by: NURSE PRACTITIONER

## 2024-09-26 PROCEDURE — 82043 UR ALBUMIN QUANTITATIVE: CPT | Performed by: NURSE PRACTITIONER

## 2024-09-26 PROCEDURE — 83036 HEMOGLOBIN GLYCOSYLATED A1C: CPT | Performed by: NURSE PRACTITIONER

## 2024-09-26 PROCEDURE — 36415 COLL VENOUS BLD VENIPUNCTURE: CPT | Performed by: NURSE PRACTITIONER

## 2024-09-26 PROCEDURE — 85025 COMPLETE CBC W/AUTO DIFF WBC: CPT | Performed by: NURSE PRACTITIONER

## 2024-09-26 PROCEDURE — G0103 PSA SCREENING: HCPCS | Performed by: NURSE PRACTITIONER

## 2024-09-26 PROCEDURE — 99396 PREV VISIT EST AGE 40-64: CPT | Performed by: NURSE PRACTITIONER

## 2024-09-26 RX ORDER — ATORVASTATIN CALCIUM 40 MG/1
40 TABLET, FILM COATED ORAL AT BEDTIME
Qty: 90 TABLET | Refills: 3 | Status: SHIPPED | OUTPATIENT
Start: 2024-09-26

## 2024-09-26 RX ORDER — CLOPIDOGREL BISULFATE 75 MG/1
75 TABLET ORAL DAILY
Qty: 90 TABLET | Refills: 3 | Status: SHIPPED | OUTPATIENT
Start: 2024-09-26

## 2024-09-26 RX ORDER — METFORMIN HCL 500 MG
1000 TABLET, EXTENDED RELEASE 24 HR ORAL 2 TIMES DAILY WITH MEALS
Qty: 360 TABLET | Refills: 3 | Status: SHIPPED | OUTPATIENT
Start: 2024-09-26

## 2024-09-26 ASSESSMENT — PAIN SCALES - GENERAL: PAINLEVEL: NO PAIN (0)

## 2024-09-26 NOTE — PROGRESS NOTES
"Preventive Care Visit  [facility] BRENT Reynolds NP,    Sep 26, 2024      Assessment & Plan   Problem List Items Addressed This Visit       Diabetes mellitus, type 2 (H)    Relevant Medications    metFORMIN (GLUCOPHAGE XR) 500 MG 24 hr tablet    Other Relevant Orders    HEMOGLOBIN A1C    Albumin Random Urine Quantitative with Creat Ratio    Comprehensive metabolic panel     Other Visit Diagnoses       Annual physical exam    -  Primary    Relevant Orders    CBC with platelets and differential    Screen for colon cancer        Hyperlipidemia LDL goal <100        Relevant Medications    clopidogrel (PLAVIX) 75 MG tablet    atorvastatin (LIPITOR) 40 MG tablet    Other Relevant Orders    Lipid Profile (Chol, Trig, HDL, LDL calc)    Anxiety        Coronary artery disease involving native heart without angina pectoris, unspecified vessel or lesion type        Former smoker        History of stroke        DIMAS (obstructive sleep apnea)        History of heart attack        BMI 28.0-28.9,adult        Special screening for malignant neoplasms, colon        Relevant Orders    COLOGUARD(EXACT SCIENCES)    Screening for prostate cancer        Relevant Orders    PSA, screen                    BMI  Estimated body mass index is 28.67 kg/m  as calculated from the following:    Height as of this encounter: 1.778 m (5' 10\").    Weight as of this encounter: 90.6 kg (199 lb 12.8 oz).     Wt Readings from Last 5 Encounters:   09/26/24 90.6 kg (199 lb 12.8 oz)   01/11/24 93.8 kg (206 lb 14.4 oz)   11/27/23 97.5 kg (215 lb)   11/22/23 97.9 kg (215 lb 12.8 oz)   08/02/23 102.8 kg (226 lb 11.2 oz)       Counseling  Appropriate preventive services were addressed with this patient via screening, questionnaire, or discussion as appropriate for fall prevention, nutrition, physical activity, Tobacco-use cessation, social engagement, weight loss and cognition.  Checklist reviewing preventive services available has been " given to the patient.  Reviewed patient's diet, addressing concerns and/or questions.           Subjective   Inocencio is a 54 year old, presenting for the following:  Physical and Labs (Pt states that he is fasting )        9/26/2024    10:43 AM   Additional Questions   Roomed by FOX Villanueva   Accompanied by Self        Health Care Directive  Patient does not have a Health Care Directive or Living Will: Advance Directive received and scanned. Click on Code in the patient header to view.    HPI              9/23/2024   General Health   How would you rate your overall physical health? Good   Feel stress (tense, anxious, or unable to sleep) Not at all            9/23/2024   Nutrition   Three or more servings of calcium each day? Yes   Diet: Low fat/cholesterol   How many servings of fruit and vegetables per day? (!) 2-3   How many sweetened beverages each day? 0-1            9/23/2024   Exercise   Days per week of moderate/strenous exercise 4 days   Average minutes spent exercising at this level 30 min            9/23/2024   Social Factors   Frequency of gathering with friends or relatives Twice a week   Worry food won't last until get money to buy more No   Food not last or not have enough money for food? No   Do you have housing? (Housing is defined as stable permanent housing and does not include staying ouside in a car, in a tent, in an abandoned building, in an overnight shelter, or couch-surfing.) Yes   Are you worried about losing your housing? No   Lack of transportation? No   Unable to get utilities (heat,electricity)? No            9/23/2024   Fall Risk   Fallen 2 or more times in the past year? No   Trouble with walking or balance? No             9/23/2024   Dental   Dentist two times every year? Yes            8/6/2024   TB Screening   Were you born outside of the [place]? No              Today's PHQ-2 Score:       1/10/2024     9:28 PM   PHQ-2 ( 1999 [employer])   Q1: Little interest or pleasure in doing things 0    Q2: Feeling down, depressed or hopeless 0   PHQ-2 Score 0   Q1: Little interest or pleasure in doing things Not at all   Q2: Feeling down, depressed or hopeless Not at all   PHQ-2 Score 0         2024   Substance Use   Alcohol more than 3/day or more than 7/wk Not Applicable   Do you use any other substances recreationally? No        Social History     Tobacco Use    Smoking status: Former     Current packs/day: 0.00     Types: Cigarettes     Quit date: 1989     Years since quittin.7    Smokeless tobacco: Never   Substance Use Topics    Alcohol use: Yes     Alcohol/week: 3.0 standard drinks of alcohol    Drug use: Not Currently     Types: Marijuana           2024   STI Screening   New sexual partner(s) since last STI/HIV test? (!) DECLINE      ASCVD Risk   The ASCVD Risk score (Shanti SOL, et al., 2019) failed to calculate for the following reasons:    The patient has a prior MI or stroke diagnosis           Reviewed and updated as needed this visit by Provider                    Past Medical History:   Diagnosis Date    History of MI (myocardial infarction)     Stroke (H)      Past Surgical History:   Procedure Laterality Date    CAROTID ENDARTERECTOMY Left     MOUTH SURGERY       OB History   No obstetric history on file.     BP Readings from Last 3 Encounters:   24 137/79   24 128/70   23 138/76    Wt Readings from Last 3 Encounters:   24 90.6 kg (199 lb 12.8 oz)   24 93.8 kg (206 lb 14.4 oz)   23 97.5 kg (215 lb)                  Patient Active Problem List   Diagnosis    Diabetes mellitus, type 2 (H)     Past Surgical History:   Procedure Laterality Date    CAROTID ENDARTERECTOMY Left     MOUTH SURGERY         Social History     Tobacco Use    Smoking status: Former     Current packs/day: 0.00     Types: Cigarettes     Quit date: 1989     Years since quittin.7    Smokeless tobacco: Never   Substance Use Topics    Alcohol use: Yes      Alcohol/week: 3.0 standard drinks of alcohol     Family History   Problem Relation Age of Onset    Heart Disease Mother     Hyperlipidemia Mother     Hypertension Mother     Diabetes Mother     Coronary Artery Disease Father     Heart Disease Father     Substance Abuse Brother     Diabetes Brother     Substance Abuse Brother          Current Outpatient Medications   Medication Sig Dispense Refill    aspirin 81 mg chewable tablet [ASPIRIN 81 MG CHEWABLE TABLET] Chew 81 mg.      atorvastatin (LIPITOR) 40 MG tablet Take 1 tablet (40 mg) by mouth at bedtime. 90 tablet 3    clopidogrel (PLAVIX) 75 MG tablet Take 1 tablet (75 mg) by mouth daily. 90 tablet 3    glucosamine-chondroit-vit C-Mn 500-400 mg cap [GLUCOSAMINE-CHONDROIT-VIT C--400 MG CAP] Take 1 tablet by mouth daily.      metFORMIN (GLUCOPHAGE XR) 500 MG 24 hr tablet Take 2 tablets (1,000 mg) by mouth 2 times daily (with meals). 360 tablet 3     No Known Allergies  Recent Labs   Lab Test 01/11/24  0728 08/02/23  0827 07/28/22  1040 10/29/21  0832 04/14/21  0854 12/20/20  1708 09/02/20  0740 12/20/19  0743   A1C 5.8* 8.3* 5.8*   < > 6.7*  --    < > 5.9   LDL 69 90 114*   < >  --   --    < > 93   HDL 35* 38* 48   < >  --   --    < > 45   TRIG 134 158* 83   < >  --   --    < > 127   ALT 33 53 45   < > 64* 92*   < > 51*   CR 0.78 0.82 0.87   < > 0.88 0.88   < > 0.86   GFRESTIMATED >90 >90 >90   < > >60 >60   < > >60   GFRESTBLACK  --   --   --   --  >60 >60   < > >60   POTASSIUM 4.8 4.1 4.1   < > 4.6 4.0   < > 4.2   TSH  --   --   --   --   --  1.88  --  1.79    < > = values in this interval not displayed.          Review of Systems  Constitutional, HEENT, cardiovascular, pulmonary, GI, , musculoskeletal, neuro, skin, endocrine and psych systems are negative, except as otherwise noted.     Objective    Exam  /79 (BP Location: Right arm, Patient Position: Sitting, Cuff Size: Adult Regular)   Pulse 60   Temp 98  F (36.7  C) (Oral)   Resp 18   Ht 1.778  "m (5' 10\")   Wt 90.6 kg (199 lb 12.8 oz)   SpO2 96%   BMI 28.67 kg/m     Estimated body mass index is 28.67 kg/m  as calculated from the following:    Height as of this encounter: 1.778 m (5' 10\").    Weight as of this encounter: 90.6 kg (199 lb 12.8 oz).    Physical Exam  GENERAL: alert and no distress  EYES: Eyes grossly normal to inspection, PERRL and conjunctivae and sclerae normal  HENT: ear canals and TM's normal, nose and mouth without ulcers or lesions  NECK: no adenopathy    RESP: lungs clear to auscultation - no rales, rhonchi or wheezes  CV: regular rate and rhythm, normal S1 S2  no peripheral edema  ABDOMEN: soft, nontender, no hepatosplenomegaly, no masses and bowel sounds normal  MS: no gross musculoskeletal defects noted, no edema  SKIN: no suspicious lesions or rashes  NEURO: Normal strength and tone, mentation intact and speech normal  PSYCH: mentation appears normal, affect normal/bright        Signed Electronically by: Radha Reynolds NP    "

## 2024-10-08 ENCOUNTER — TELEPHONE (OUTPATIENT)
Dept: INTERNAL MEDICINE | Facility: CLINIC | Age: 54
End: 2024-10-08
Payer: COMMERCIAL

## 2024-10-08 DIAGNOSIS — E78.5 HYPERLIPIDEMIA LDL GOAL <100: ICD-10-CM

## 2024-10-08 RX ORDER — ATORVASTATIN CALCIUM 40 MG/1
40 TABLET, FILM COATED ORAL AT BEDTIME
Qty: 90 TABLET | Refills: 2 | Status: SHIPPED | OUTPATIENT
Start: 2024-10-08

## 2024-10-08 RX ORDER — ATORVASTATIN CALCIUM 40 MG/1
40 TABLET, FILM COATED ORAL AT BEDTIME
Qty: 90 TABLET | Refills: 3 | Status: CANCELLED | OUTPATIENT
Start: 2024-10-08

## 2024-10-08 NOTE — TELEPHONE ENCOUNTER
Medication Question or Refill        What medication are you calling about (include dose and sig)?:        Disp Refills Start End HILARIO  atorvastatin (LIPITOR) 40 MG tablet 90 tablet 3 9/26/2024 -- No  Sig - Route: Take 1 tablet (40 mg) by mouth at bedtime. - Oral  Sent to pharmacy as: Atorvastatin Calcium 40 MG Oral Tablet (LIPITOR)  Class: E-Prescribe  Order: [id number]  E-Prescribing Status: Receipt confirmed by pharmacy (9/26/2024 11:17 AM CDT)      Preferred Pharmacy:     [id number] - [place], [place] - [address] [place] [address] AT [facility] [address]  [address] [facility] [address]  Phone: [phone] Fax: [phone]    -Patient did not  last active medication at [facility], [facility] to [facility] going forward       Controlled Substance Agreement on file:   CSA -- Patient Level:    CSA: None found at the patient level.       Who prescribed the medication?: PCP      Patient offered an appointment? Yes: 3/27/2024    Do you have any questions or concerns?  No      Could we send this information to you in [facility] or would you prefer to receive a phone call?:   Patient would prefer a phone call   Okay to leave a detailed message?: Yes at Home number on file [phone] (home)

## 2024-10-08 NOTE — TELEPHONE ENCOUNTER
Pt calling, and stating that he has only been taking 500MG of Metformin a day (1pill per day) instead of 2 500MG a day.     Per pt he has been feeling good since only taking 500MG a day.     May call him if you have any other questions.

## 2024-10-09 LAB — NONINV COLON CA DNA+OCC BLD SCRN STL QL: NEGATIVE

## 2024-12-09 DIAGNOSIS — E11.9 TYPE 2 DIABETES MELLITUS WITHOUT COMPLICATION, WITHOUT LONG-TERM CURRENT USE OF INSULIN (H): ICD-10-CM

## 2024-12-09 DIAGNOSIS — E78.5 HYPERLIPIDEMIA LDL GOAL <100: ICD-10-CM

## 2024-12-09 RX ORDER — METFORMIN HYDROCHLORIDE 500 MG/1
1000 TABLET, EXTENDED RELEASE ORAL 2 TIMES DAILY WITH MEALS
Qty: 360 TABLET | Refills: 2 | Status: SHIPPED | OUTPATIENT
Start: 2024-12-09

## 2024-12-09 RX ORDER — METFORMIN HYDROCHLORIDE 500 MG/1
1000 TABLET, EXTENDED RELEASE ORAL 2 TIMES DAILY WITH MEALS
Qty: 360 TABLET | Refills: 3 | Status: CANCELLED | OUTPATIENT
Start: 2024-12-09

## 2024-12-09 RX ORDER — CLOPIDOGREL BISULFATE 75 MG/1
75 TABLET ORAL DAILY
Qty: 90 TABLET | Refills: 2 | Status: SHIPPED | OUTPATIENT
Start: 2024-12-09

## 2024-12-09 RX ORDER — CLOPIDOGREL BISULFATE 75 MG/1
75 TABLET ORAL DAILY
Qty: 90 TABLET | Refills: 3 | Status: CANCELLED | OUTPATIENT
Start: 2024-12-09

## 2024-12-09 NOTE — TELEPHONE ENCOUNTER
Reason for Call:  Medication : Pharmacy Change    Do you use a [facility]? No  Name of the pharmacy and phone number for the current request:  NEW Pharmacy     [facility] [address]     Name of the medication requested: clopidogrel (PLAVIX) 75 MG tablet   metFORMIN (GLUCOPHAGE XR) 500 MG 24 hr tablet       Other request: 11/29 [facility] expressing his frustration with [facility] lines and the amount of time it takes to  RX.  Patient requested to switch to [facility] he didn't care if insurance covered or not.    Today patient is requesting to [facility] states he can't go to [facility] his insurance does not cover.     Can we leave a detailed message on this number? YES would appreciate phone call so he can  RX.  States he has been out of medication for about a week now.     Phone number patient can be reached at: Cell number on file:    Telephone Information:   Mobile [phone]       Best Time: any    Call taken on 12/9/2024 at 11:13 AM by Ailyn Galvan

## 2024-12-18 ENCOUNTER — MYC REFILL (OUTPATIENT)
Dept: INTERNAL MEDICINE | Facility: CLINIC | Age: 54
End: 2024-12-18
Payer: COMMERCIAL

## 2024-12-18 DIAGNOSIS — E78.5 HYPERLIPIDEMIA LDL GOAL <100: ICD-10-CM

## 2024-12-18 RX ORDER — ATORVASTATIN CALCIUM 40 MG/1
40 TABLET, FILM COATED ORAL AT BEDTIME
Qty: 90 TABLET | Refills: 2 | Status: SHIPPED | OUTPATIENT
Start: 2024-12-18

## 2025-01-12 ENCOUNTER — HEALTH MAINTENANCE LETTER (OUTPATIENT)
Age: 55
End: 2025-01-12

## 2025-02-24 ENCOUNTER — MYC MEDICAL ADVICE (OUTPATIENT)
Dept: INTERNAL MEDICINE | Facility: CLINIC | Age: 55
End: 2025-02-24
Payer: COMMERCIAL

## 2025-02-24 DIAGNOSIS — F41.9 ANXIETY: ICD-10-CM

## 2025-02-25 RX ORDER — HYDROXYZINE PAMOATE 25 MG/1
25 CAPSULE ORAL 3 TIMES DAILY PRN
Qty: 21 CAPSULE | Refills: 0 | Status: SHIPPED | OUTPATIENT
Start: 2025-02-25

## 2025-04-24 ENCOUNTER — OFFICE VISIT (OUTPATIENT)
Dept: INTERNAL MEDICINE | Facility: CLINIC | Age: 55
End: 2025-04-24
Payer: COMMERCIAL

## 2025-04-24 VITALS
OXYGEN SATURATION: 96 % | TEMPERATURE: 98.1 F | HEIGHT: 70 IN | BODY MASS INDEX: 31.55 KG/M2 | DIASTOLIC BLOOD PRESSURE: 64 MMHG | SYSTOLIC BLOOD PRESSURE: 120 MMHG | WEIGHT: 220.4 LBS | HEART RATE: 64 BPM | RESPIRATION RATE: 12 BRPM

## 2025-04-24 DIAGNOSIS — E78.5 HYPERLIPIDEMIA LDL GOAL <100: ICD-10-CM

## 2025-04-24 DIAGNOSIS — I25.2 HISTORY OF HEART ATTACK: ICD-10-CM

## 2025-04-24 DIAGNOSIS — F41.9 ANXIETY: ICD-10-CM

## 2025-04-24 DIAGNOSIS — G47.33 OSA (OBSTRUCTIVE SLEEP APNEA): ICD-10-CM

## 2025-04-24 DIAGNOSIS — E66.811 CLASS 1 OBESITY WITH SERIOUS COMORBIDITY AND BODY MASS INDEX (BMI) OF 31.0 TO 31.9 IN ADULT, UNSPECIFIED OBESITY TYPE: ICD-10-CM

## 2025-04-24 DIAGNOSIS — E11.9 TYPE 2 DIABETES MELLITUS WITHOUT COMPLICATION, WITHOUT LONG-TERM CURRENT USE OF INSULIN (H): ICD-10-CM

## 2025-04-24 DIAGNOSIS — I25.10 CORONARY ARTERY DISEASE INVOLVING NATIVE HEART WITHOUT ANGINA PECTORIS, UNSPECIFIED VESSEL OR LESION TYPE: ICD-10-CM

## 2025-04-24 DIAGNOSIS — Z12.5 SCREENING FOR PROSTATE CANCER: ICD-10-CM

## 2025-04-24 DIAGNOSIS — I65.21 CAROTID STENOSIS, SYMPTOMATIC W/O INFARCT, RIGHT: ICD-10-CM

## 2025-04-24 DIAGNOSIS — Z00.00 ANNUAL PHYSICAL EXAM: Primary | ICD-10-CM

## 2025-04-24 LAB
ALBUMIN UR-MCNC: NEGATIVE MG/DL
APPEARANCE UR: CLEAR
BASOPHILS # BLD AUTO: 0 10E3/UL (ref 0–0.2)
BASOPHILS NFR BLD AUTO: 0 %
BILIRUB UR QL STRIP: NEGATIVE
COLOR UR AUTO: YELLOW
EOSINOPHIL # BLD AUTO: 0.3 10E3/UL (ref 0–0.7)
EOSINOPHIL NFR BLD AUTO: 5 %
ERYTHROCYTE [DISTWIDTH] IN BLOOD BY AUTOMATED COUNT: 11.9 % (ref 10–15)
EST. AVERAGE GLUCOSE BLD GHB EST-MCNC: 169 MG/DL
GLUCOSE UR STRIP-MCNC: NEGATIVE MG/DL
HBA1C MFR BLD: 7.5 % (ref 0–5.6)
HCT VFR BLD AUTO: 43.5 % (ref 40–53)
HGB BLD-MCNC: 15.1 G/DL (ref 13.3–17.7)
HGB UR QL STRIP: NEGATIVE
IMM GRANULOCYTES # BLD: 0 10E3/UL
IMM GRANULOCYTES NFR BLD: 0 %
KETONES UR STRIP-MCNC: NEGATIVE MG/DL
LEUKOCYTE ESTERASE UR QL STRIP: NEGATIVE
LYMPHOCYTES # BLD AUTO: 1.5 10E3/UL (ref 0.8–5.3)
LYMPHOCYTES NFR BLD AUTO: 23 %
MCH RBC QN AUTO: 30.2 PG (ref 26.5–33)
MCHC RBC AUTO-ENTMCNC: 34.7 G/DL (ref 31.5–36.5)
MCV RBC AUTO: 87 FL (ref 78–100)
MONOCYTES # BLD AUTO: 0.4 10E3/UL (ref 0–1.3)
MONOCYTES NFR BLD AUTO: 7 %
NEUTROPHILS # BLD AUTO: 4.2 10E3/UL (ref 1.6–8.3)
NEUTROPHILS NFR BLD AUTO: 65 %
NITRATE UR QL: NEGATIVE
PH UR STRIP: 6 [PH] (ref 5–8)
PLATELET # BLD AUTO: 247 10E3/UL (ref 150–450)
RBC # BLD AUTO: 5 10E6/UL (ref 4.4–5.9)
SP GR UR STRIP: 1.02 (ref 1–1.03)
UROBILINOGEN UR STRIP-ACNC: 1 E.U./DL
WBC # BLD AUTO: 6.5 10E3/UL (ref 4–11)

## 2025-04-24 RX ORDER — CLOPIDOGREL BISULFATE 75 MG/1
75 TABLET ORAL DAILY
Qty: 90 TABLET | Refills: 3 | Status: SHIPPED | OUTPATIENT
Start: 2025-04-24

## 2025-04-24 RX ORDER — ATORVASTATIN CALCIUM 40 MG/1
40 TABLET, FILM COATED ORAL AT BEDTIME
Qty: 90 TABLET | Refills: 3 | Status: SHIPPED | OUTPATIENT
Start: 2025-04-24

## 2025-04-24 RX ORDER — HYDROXYZINE PAMOATE 25 MG/1
25 CAPSULE ORAL 3 TIMES DAILY PRN
Qty: 90 CAPSULE | Refills: 0 | Status: SHIPPED | OUTPATIENT
Start: 2025-04-24

## 2025-04-24 RX ORDER — METFORMIN HYDROCHLORIDE 500 MG/1
500 TABLET, EXTENDED RELEASE ORAL
Qty: 90 TABLET | Refills: 3 | Status: SHIPPED | OUTPATIENT
Start: 2025-04-24

## 2025-04-24 ASSESSMENT — PAIN SCALES - GENERAL: PAINLEVEL_OUTOF10: NO PAIN (0)

## 2025-04-24 NOTE — PROGRESS NOTES
"  Assessment & Plan   Problem List Items Addressed This Visit       Diabetes mellitus, type 2 (H)    Relevant Medications    metFORMIN (GLUCOPHAGE XR) 500 MG 24 hr tablet    Other Relevant Orders    HEMOGLOBIN A1C    Comprehensive metabolic panel    CBC with platelets and differential    UA Macroscopic with reflex to Microscopic and Culture - Lab Collect     Other Visit Diagnoses       Annual physical exam    -  Primary    Coronary artery disease involving native heart without angina pectoris, unspecified vessel or lesion type        Relevant Orders    Lipid Profile (Chol, Trig, HDL, LDL calc)    DIMAS (obstructive sleep apnea)        History of heart attack        Carotid stenosis, symptomatic w/o infarct, right        Class 1 obesity with serious comorbidity and body mass index (BMI) of 31.0 to 31.9 in adult, unspecified obesity type        Relevant Medications    metFORMIN (GLUCOPHAGE XR) 500 MG 24 hr tablet    Screening for prostate cancer        Relevant Orders    PSA, screen    Anxiety        Relevant Medications    hydrOXYzine risa (VISTARIL) 25 MG capsule    Hyperlipidemia LDL goal <100        Relevant Medications    atorvastatin (LIPITOR) 40 MG tablet    clopidogrel (PLAVIX) 75 MG tablet           Patient dx of DIMAS, tried and failed cpap and is not interested in further evaluation and management     Recommend 30 minutes of purposeful physical activity most days of the week, weightloss is encouraged.      BMI  Estimated body mass index is 31.62 kg/m  as calculated from the following:    Height as of this encounter: 1.778 m (5' 10\").    Weight as of this encounter: 100 kg (220 lb 6.4 oz).   Weight management plan: Discussed healthy diet and exercise guidelines  Wt Readings from Last 5 Encounters:   04/24/25 100 kg (220 lb 6.4 oz)   09/26/24 90.6 kg (199 lb 12.8 oz)   01/11/24 93.8 kg (206 lb 14.4 oz)   11/27/23 97.5 kg (215 lb)   11/22/23 97.9 kg (215 lb 12.8 oz)           Subjective   Inocencio is a 54 year old, " "presenting for the following health issues:  Recheck Medication        4/24/2025     2:03 PM   Additional Questions   Roomed by Stevie Dia   Accompanied by Self     History of Present Illness       Diabetes:   He presents for follow up of diabetes.  He is checking home blood glucose a few times a month.   He checks blood glucose before meals.  Blood glucose is never over 200 and never under 70.  When his blood glucose is low, the patient is asymptomatic for confusion, blurred vision, lethargy and reports not feeling dizzy, shaky, or weak.   He has no concerns regarding his diabetes at this time.  He is having weight gain.            He eats 2-3 servings of fruits and vegetables daily.He consumes 0 sweetened beverage(s) daily.He exercises with enough effort to increase his heart rate 30 to 60 minutes per day.  He exercises with enough effort to increase his heart rate 4 days per week.   He is taking medications regularly.            Review of Systems  Constitutional, HEENT, cardiovascular, pulmonary, GI, , musculoskeletal, neuro, skin, endocrine and psych systems are negative, except as otherwise noted.      Objective    /64   Pulse 64   Temp 98.1  F (36.7  C) (Oral)   Resp 12   Ht 1.778 m (5' 10\")   Wt 100 kg (220 lb 6.4 oz)   SpO2 96%   BMI 31.62 kg/m    Body mass index is 31.62 kg/m .  Physical Exam   GENERAL: alert and no distress  EYES: Eyes grossly normal to inspection, PERRL and conjunctivae and sclerae normal  HENT: ear canals and TM's normal, nose and mouth without ulcers or lesions, enlarged uvula   NECK: no adenopathy   RESP: lungs clear to auscultation - no rales, rhonchi or wheezes  CV: regular rate and rhythm, normal S1 S2,, no peripheral edema  ABDOMEN: soft, nontender, no hepatosplenomegaly, no masses and bowel sounds normal  MS: no gross musculoskeletal defects noted, no edema  SKIN: no suspicious lesions or rashes  PSYCH: mentation appears normal, affect normal/bright    Orders Only " (auto-released) on 09/26/2024   Component Date Value Ref Range Status    COLOGUARD-ABSTRACT 10/03/2024 Negative  Negative Final    Comment:   NEGATIVE TEST RESULT. A negative Cologuard result indicates a low likelihood that a colorectal cancer (CRC) or advanced adenoma (adenomatous polyps with more advanced pre-malignant features)  is present. The chance that a person with a negative Cologuard test has a colorectal cancer is less than 1 in 1500 (negative predictive value >99.9%) or has an  advanced adenoma is less than  5.3% (negative predictive value 94.7%). These data are based on a prospective cross-sectional study of 10,000 individuals at average risk for colorectal cancer who were screened with both Cologuard and colonoscopy. (Jane CERRATO et al, N Engl J Med 2014;370(14):5222-8850) The normal value (reference range) for this assay is negative.    COLOGUARD RE-SCREENING RECOMMENDATION: Periodic colorectal cancer screening is an important part of preventive healthcare for asymptomatic individuals at average risk for colorectal cancer.  Following a negative Cologuard result, the American Cancer Society and U.S.                            Multi-Society Task Force screening guidelines recommend a Cologuard re-screening interval of 3 years.   References: American Cancer Society Guideline for Colorectal Cancer Screening: https://www.cancer.org/cancer/colon-rectal-cancer/hwilnochk-wpvbtwhde-aqyduaf/acs-recommendations.html.; Victor Hugo SOL, Chanelle HOFFMAN, Leeanna VILLALOBOSK, Colorectal Cancer Screening: Recommendations for Physicians and Patients from the U.S. Multi-Society Task Force on Colorectal Cancer Screening , Am J Gastroenterology 2017; 112:1171-4111.    TEST DESCRIPTION: Composite algorithmic analysis of stool DNA-biomarkers with hemoglobin immunoassay.   Quantitative values of individual biomarkers are not reportable and are not associated with individual biomarker result reference ranges. Cologuard is intended for colorectal  cancer screening of adults of either sex, 45 years or older, who are at average-risk for colorectal cancer (CRC). Cologuard has been approved for use by the U.S. FDA. The performance of Cologuard was                            established in a cross sectional study of average-risk adults aged 50-84. Cologuard performance in patients ages 45 to 49 years was estimated by sub-group analysis of near-age groups. Colonoscopies performed for a positive result may find as the most clinically significant lesion: colorectal cancer [4.0%], advanced adenoma (including sessile serrated polyps greater than or equal to 1cm diameter) [20%] or non- advanced adenoma [31%]; or no colorectal neoplasia [45%]. These estimates are derived from a prospective cross-sectional screening study of 10,000 individuals at average risk for colorectal cancer who were screened with both Cologuard and colonoscopy. (Jane Peña al, N Engl J Med 2014;370(14):3392-9080.) Cologuard may produce a false negative or false positive result (no colorectal cancer or precancerous polyp present at colonoscopy follow up). A negative Cologuard test result does not guarantee the absence of CRC or advanced adenoma (pre-cancer). The current Cologuard                            screening interval is every 3 years. (American Cancer Society and U.S. Multi-Society Task Force). Cologuard performance data in a 10,000 patient pivotal study using colonoscopy as the reference method can be accessed at the following location: www.[employer].SPO/results. Additional description of the Cologuard test process, warnings and precautions can be found at www.[employer].com.     BP     120/64  4/24/2025    Lab Results   Component Value Date     09/26/2024     10/29/2021     Lab Results   Component Value Date    A1C 6.2 09/26/2024     Lab Results   Component Value Date    LDL 65 09/26/2024     Lab Results   Component Value Date    MICROL <12.0 09/26/2024     Current  Outpatient Medications:     aspirin 81 mg chewable tablet, [ASPIRIN 81 MG CHEWABLE TABLET] Chew 81 mg., Disp: , Rfl:     atorvastatin (LIPITOR) 40 MG tablet, Take 1 tablet (40 mg) by mouth at bedtime., Disp: 90 tablet, Rfl: 3    clopidogrel (PLAVIX) 75 MG tablet, Take 1 tablet (75 mg) by mouth daily., Disp: 90 tablet, Rfl: 3    glucosamine-chondroit-vit C-Mn 500-400 mg cap, [GLUCOSAMINE-CHONDROIT-VIT C--400 MG CAP] Take 1 tablet by mouth daily., Disp: , Rfl:     hydrOXYzine risa (VISTARIL) 25 MG capsule, Take 1 capsule (25 mg) by mouth 3 times daily as needed for anxiety., Disp: 90 capsule, Rfl: 0    metFORMIN (GLUCOPHAGE XR) 500 MG 24 hr tablet, Take 1 tablet (500 mg) by mouth daily (with dinner)., Disp: 90 tablet, Rfl: 3          Signed Electronically by: Radha Reynolds CNP

## 2025-04-27 DIAGNOSIS — E11.9 TYPE 2 DIABETES MELLITUS WITHOUT COMPLICATION, WITHOUT LONG-TERM CURRENT USE OF INSULIN (H): ICD-10-CM

## 2025-04-27 RX ORDER — METFORMIN HYDROCHLORIDE 500 MG/1
500 TABLET, EXTENDED RELEASE ORAL 2 TIMES DAILY WITH MEALS
Qty: 90 TABLET | Refills: 3 | Status: SHIPPED | OUTPATIENT
Start: 2025-04-27

## 2025-05-15 ENCOUNTER — MYC MEDICAL ADVICE (OUTPATIENT)
Dept: INTERNAL MEDICINE | Facility: CLINIC | Age: 55
End: 2025-05-15
Payer: COMMERCIAL

## 2025-06-04 ENCOUNTER — MYC MEDICAL ADVICE (OUTPATIENT)
Dept: INTERNAL MEDICINE | Facility: CLINIC | Age: 55
End: 2025-06-04
Payer: COMMERCIAL

## 2025-06-04 NOTE — TELEPHONE ENCOUNTER
Patient last seen in April for a physical I do not see patient is established with cardiology. Please advise if ok for patient to be at elevation of 8000ft?

## 2025-07-22 ENCOUNTER — TELEPHONE (OUTPATIENT)
Dept: INTERNAL MEDICINE | Facility: CLINIC | Age: 55
End: 2025-07-22
Payer: COMMERCIAL

## 2025-07-22 DIAGNOSIS — E11.9 TYPE 2 DIABETES MELLITUS WITHOUT COMPLICATION, WITHOUT LONG-TERM CURRENT USE OF INSULIN (H): ICD-10-CM

## 2025-07-22 NOTE — TELEPHONE ENCOUNTER
Medication Question or Refill    Contacts       Contact Date/Time Type Contact Phone/Fax    07/22/2025 12:04 PM CDT Phone (Incoming) Inocencio Macias (Self) [phone] (H)            What medication are you calling about (include dose and sig)?: metFORMIN (GLUCOPHAGE XR) 500 MG 24 hr tablet     Patient is unable to  RX from [facility].  Patient thought he was supposed to take two tablets in the AM and two tablets in evening.  States he just read the RX and realized he was taking medication wrong.  [facility] can not provide refill.  Patient needs new RX sent.     Preferred Pharmacy:    [place] [id number] [place] [facility] [place] [id number] [facility]  [address] [facility] [address]  Phone: [phone] Fax: [phone]      Controlled Substance Agreement on file:   CSA -- Patient Level:    CSA: None found at the patient level.       Who prescribed the medication?: Radha Reynolds NP    Do you need a refill? Yes    When did you use the medication last? today    Patient offered an appointment? No    Do you have any questions or concerns?  Yes: unable to get refill.  Patient shares he was taking medication incorrectly.  Take 2 tablets in AM and 2 tablets in evening for a total of 4 tablets daily.        Could we send this information to you in [place] or would you prefer to receive a phone call?:   Patient would like to be contacted via [place]

## 2025-07-24 RX ORDER — METFORMIN HYDROCHLORIDE 500 MG/1
500 TABLET, EXTENDED RELEASE ORAL 2 TIMES DAILY WITH MEALS
Qty: 90 TABLET | Refills: 3 | Status: SHIPPED | OUTPATIENT
Start: 2025-07-24

## 2025-08-09 ENCOUNTER — HEALTH MAINTENANCE LETTER (OUTPATIENT)
Age: 55
End: 2025-08-09